# Patient Record
Sex: FEMALE | Race: WHITE | NOT HISPANIC OR LATINO | URBAN - NONMETROPOLITAN AREA
[De-identification: names, ages, dates, MRNs, and addresses within clinical notes are randomized per-mention and may not be internally consistent; named-entity substitution may affect disease eponyms.]

---

## 2020-09-01 ENCOUNTER — HOSPITAL ENCOUNTER (OUTPATIENT)
Facility: HOSPITAL | Age: 60
Discharge: NURSING, REHAB OR LONG-TERM CARE | End: 2020-09-22
Attending: INTERNAL MEDICINE | Admitting: INTERNAL MEDICINE

## 2020-09-01 ENCOUNTER — APPOINTMENT (OUTPATIENT)
Dept: GENERAL RADIOLOGY | Facility: HOSPITAL | Age: 60
End: 2020-09-01

## 2020-09-01 DIAGNOSIS — R13.10 DYSPHAGIA, UNSPECIFIED TYPE: Primary | ICD-10-CM

## 2020-09-01 LAB
ALBUMIN SERPL-MCNC: 3.5 G/DL (ref 3.5–5.2)
ALBUMIN/GLOB SERPL: 1.3 G/DL
ALP SERPL-CCNC: 79 U/L (ref 39–117)
ALT SERPL W P-5'-P-CCNC: 97 U/L (ref 1–33)
ANION GAP SERPL CALCULATED.3IONS-SCNC: 8 MMOL/L (ref 5–15)
AST SERPL-CCNC: 80 U/L (ref 1–32)
BILIRUB SERPL-MCNC: 1.3 MG/DL (ref 0–1.2)
BUN SERPL-MCNC: 33 MG/DL (ref 6–20)
BUN/CREAT SERPL: 132 (ref 7–25)
CALCIUM SPEC-SCNC: 7.8 MG/DL (ref 8.6–10.5)
CHLORIDE SERPL-SCNC: 110 MMOL/L (ref 98–107)
CHOLEST SERPL-MCNC: 251 MG/DL (ref 0–200)
CO2 SERPL-SCNC: 30 MMOL/L (ref 22–29)
CREAT SERPL-MCNC: 0.25 MG/DL (ref 0.57–1)
CRP SERPL-MCNC: 1.53 MG/DL (ref 0–0.5)
GFR SERPL CREATININE-BSD FRML MDRD: >150 ML/MIN/1.73
GLOBULIN UR ELPH-MCNC: 2.7 GM/DL
GLUCOSE BLDC GLUCOMTR-MCNC: 193 MG/DL (ref 70–130)
GLUCOSE BLDC GLUCOMTR-MCNC: 241 MG/DL (ref 70–130)
GLUCOSE SERPL-MCNC: 228 MG/DL (ref 65–99)
MAGNESIUM SERPL-MCNC: 2.8 MG/DL (ref 1.6–2.6)
PHOSPHATE SERPL-MCNC: 2.7 MG/DL (ref 2.5–4.5)
POTASSIUM SERPL-SCNC: 4.4 MMOL/L (ref 3.5–5.2)
PROT SERPL-MCNC: 6.2 G/DL (ref 6–8.5)
SODIUM SERPL-SCNC: 148 MMOL/L (ref 136–145)
TRIGL SERPL-MCNC: 186 MG/DL (ref 0–150)

## 2020-09-01 PROCEDURE — 25010000002 HEPARIN (PORCINE) PER 1000 UNITS: Performed by: INTERNAL MEDICINE

## 2020-09-01 PROCEDURE — 86140 C-REACTIVE PROTEIN: CPT | Performed by: INTERNAL MEDICINE

## 2020-09-01 PROCEDURE — 83735 ASSAY OF MAGNESIUM: CPT | Performed by: INTERNAL MEDICINE

## 2020-09-01 PROCEDURE — 25010000002 LORAZEPAM PER 2 MG

## 2020-09-01 PROCEDURE — 82465 ASSAY BLD/SERUM CHOLESTEROL: CPT | Performed by: INTERNAL MEDICINE

## 2020-09-01 PROCEDURE — 84100 ASSAY OF PHOSPHORUS: CPT | Performed by: INTERNAL MEDICINE

## 2020-09-01 PROCEDURE — 71045 X-RAY EXAM CHEST 1 VIEW: CPT

## 2020-09-01 PROCEDURE — 63710000001 INSULIN LISPRO (HUMAN) PER 5 UNITS: Performed by: INTERNAL MEDICINE

## 2020-09-01 PROCEDURE — 82962 GLUCOSE BLOOD TEST: CPT

## 2020-09-01 PROCEDURE — 25010000002 METHYLPREDNISOLONE PER 125 MG: Performed by: INTERNAL MEDICINE

## 2020-09-01 PROCEDURE — 25010000002 LORAZEPAM PER 2 MG: Performed by: INTERNAL MEDICINE

## 2020-09-01 PROCEDURE — 80053 COMPREHEN METABOLIC PANEL: CPT | Performed by: INTERNAL MEDICINE

## 2020-09-01 PROCEDURE — 84478 ASSAY OF TRIGLYCERIDES: CPT | Performed by: INTERNAL MEDICINE

## 2020-09-01 RX ORDER — ACETAMINOPHEN 325 MG/1
650 TABLET ORAL EVERY 6 HOURS PRN
Status: DISCONTINUED | OUTPATIENT
Start: 2020-09-01 | End: 2020-09-22 | Stop reason: HOSPADM

## 2020-09-01 RX ORDER — LORAZEPAM 2 MG/ML
1 INJECTION INTRAMUSCULAR EVERY 4 HOURS PRN
Status: DISCONTINUED | OUTPATIENT
Start: 2020-09-01 | End: 2020-09-08

## 2020-09-01 RX ORDER — SODIUM CHLORIDE 0.9 % (FLUSH) 0.9 %
10 SYRINGE (ML) INJECTION EVERY 12 HOURS SCHEDULED
Status: DISCONTINUED | OUTPATIENT
Start: 2020-09-01 | End: 2020-09-17 | Stop reason: ALTCHOICE

## 2020-09-01 RX ORDER — DEXTROSE MONOHYDRATE 25 G/50ML
25 INJECTION, SOLUTION INTRAVENOUS
Status: DISCONTINUED | OUTPATIENT
Start: 2020-09-01 | End: 2020-09-22 | Stop reason: HOSPADM

## 2020-09-01 RX ORDER — NICOTINE POLACRILEX 4 MG
15 LOZENGE BUCCAL
Status: DISCONTINUED | OUTPATIENT
Start: 2020-09-01 | End: 2020-09-22 | Stop reason: HOSPADM

## 2020-09-01 RX ORDER — SODIUM CHLORIDE 0.9 % (FLUSH) 0.9 %
10 SYRINGE (ML) INJECTION AS NEEDED
Status: DISCONTINUED | OUTPATIENT
Start: 2020-09-01 | End: 2020-09-17 | Stop reason: ALTCHOICE

## 2020-09-01 RX ORDER — IPRATROPIUM BROMIDE AND ALBUTEROL SULFATE 2.5; .5 MG/3ML; MG/3ML
3 SOLUTION RESPIRATORY (INHALATION)
Status: DISCONTINUED | OUTPATIENT
Start: 2020-09-01 | End: 2020-09-18

## 2020-09-01 RX ORDER — LABETALOL HYDROCHLORIDE 5 MG/ML
20 INJECTION, SOLUTION INTRAVENOUS EVERY 4 HOURS PRN
Status: DISCONTINUED | OUTPATIENT
Start: 2020-09-01 | End: 2020-09-22 | Stop reason: HOSPADM

## 2020-09-01 RX ORDER — HYDRALAZINE HYDROCHLORIDE 20 MG/ML
10 INJECTION INTRAMUSCULAR; INTRAVENOUS EVERY 4 HOURS PRN
Status: DISCONTINUED | OUTPATIENT
Start: 2020-09-01 | End: 2020-09-02

## 2020-09-01 RX ORDER — ONDANSETRON 2 MG/ML
4 INJECTION INTRAMUSCULAR; INTRAVENOUS EVERY 6 HOURS PRN
Status: DISCONTINUED | OUTPATIENT
Start: 2020-09-01 | End: 2020-09-22 | Stop reason: HOSPADM

## 2020-09-01 RX ORDER — LORAZEPAM 2 MG/ML
INJECTION INTRAMUSCULAR
Status: ACTIVE
Start: 2020-09-01 | End: 2020-09-02

## 2020-09-01 RX ORDER — ACETAMINOPHEN 650 MG/1
650 SUPPOSITORY RECTAL EVERY 4 HOURS PRN
Status: DISCONTINUED | OUTPATIENT
Start: 2020-09-01 | End: 2020-09-22 | Stop reason: HOSPADM

## 2020-09-01 RX ORDER — SACCHAROMYCES BOULARDII 250 MG
250 CAPSULE ORAL 2 TIMES DAILY
Status: DISCONTINUED | OUTPATIENT
Start: 2020-09-01 | End: 2020-09-22 | Stop reason: HOSPADM

## 2020-09-01 RX ORDER — PANTOPRAZOLE SODIUM 40 MG/10ML
40 INJECTION, POWDER, LYOPHILIZED, FOR SOLUTION INTRAVENOUS DAILY
Status: DISCONTINUED | OUTPATIENT
Start: 2020-09-02 | End: 2020-09-08

## 2020-09-01 RX ORDER — SODIUM CHLORIDE 0.9 % (FLUSH) 0.9 %
20 SYRINGE (ML) INJECTION AS NEEDED
Status: DISCONTINUED | OUTPATIENT
Start: 2020-09-01 | End: 2020-09-17 | Stop reason: ALTCHOICE

## 2020-09-01 RX ORDER — METHYLPREDNISOLONE SODIUM SUCCINATE 125 MG/2ML
80 INJECTION, POWDER, LYOPHILIZED, FOR SOLUTION INTRAMUSCULAR; INTRAVENOUS EVERY 8 HOURS SCHEDULED
Status: DISCONTINUED | OUTPATIENT
Start: 2020-09-01 | End: 2020-09-08

## 2020-09-01 RX ORDER — HEPARIN SODIUM 5000 [USP'U]/ML
5000 INJECTION, SOLUTION INTRAVENOUS; SUBCUTANEOUS EVERY 8 HOURS SCHEDULED
Status: DISCONTINUED | OUTPATIENT
Start: 2020-09-01 | End: 2020-09-22 | Stop reason: HOSPADM

## 2020-09-01 NOTE — PROGRESS NOTES
"LTACH Fall Assessment Note    Makenzie Norman is a 59 y.o.female  [Ht: 157.5 cm (62\"); Wt: 98.9 kg (218 lb)] admitted to [facility] on 9/1/2020  2:09 PM.    Current medications associated with an increased risk for fall include:  •  hydrALAZINE (APRESOLINE) injection 10 mg, 10 mg, Intravenous, Q4H PRN  •  insulin lispro (humaLOG) injection 2-7 Units, 2-7 Units, Subcutaneous, Q6H  •  labetalol (NORMODYNE,TRANDATE) injection 20 mg, 20 mg, Intravenous, Q4H PRN  •  LORazepam (ATIVAN) injection 1 mg, 1 mg, Intravenous, Q4H PRN  •  methylPREDNISolone sodium succinate (SOLU-Medrol) injection 80 mg, 80 mg, Intravenous, Q8H  •  ondansetron (ZOFRAN) injection 4 mg, 4 mg, Intravenous, Q6H PRN    Woody Blevins, PharmMELCHOR  09/01/20 16:21           "

## 2020-09-01 NOTE — PROGRESS NOTES
"Adult Nutrition  Assessment/PES    Patient Name:  Makenzie Norman  YOB: 1960  MRN: [id number]  Admit Date:  9/1/2020    Assessment Date:  9/1/2020    Comments:  New admit to ltach. She is on bipap/venti mask. No EN route. Pt admitted on TPN. Ordered D15% AA5% with lipids daily. Will monitor pertinent labs and adjust TPN as needed.     Reason for Assessment     Row Name 09/01/20 1517          Reason for Assessment    Reason For Assessment  physician consult;TF/PN     Diagnosis  neurologic conditions;pulmonary disease;diabetes diagnosis/complications;cardiac disease         Nutrition/Diet History     Row Name 09/01/20 1525          Nutrition/Diet History    Typical Food/Fluid Intake  New admit to ltach. Pt is on a venti mask/bipap. She has a central line for TPN with TPN from previous facility hanging. Pt has not been able to pass a swallow study and her brother is not wanting her to have EN.      Factors Affecting Nutritional Intake  difficulty/impaired swallowing         Anthropometrics     Row Name 09/01/20 1527 09/01/20 1300       Anthropometrics    Height  --  157.5 cm (62\")    Weight  --  98.9 kg (218 lb)       Ideal Body Weight (IBW)    Ideal Body Weight (IBW) (kg)  --  50.43    % Ideal Body Weight  --  196.07       Body Mass Index (BMI)    BMI (kg/m2)  --  39.96    BMI Assessment  BMI 35-39.9: obesity grade II  --        Labs/Tests/Procedures/Meds     Row Name 09/01/20 1528          Labs/Procedures/Meds    Lab Results Reviewed  reviewed, pertinent     Lab Results Comments  glucose; Na; alt; Ast; albumin        Medications    Pertinent Medications Reviewed  reviewed, pertinent     Pertinent Medications Comments  heparin; insulin; steroid; protonix; florastor         Physical Findings     Row Name 09/01/20 1528          Physical Findings    Overall Physical Appearance  obese;on oxygen therapy         Estimated/Assessed Needs     Row Name 09/01/20 1528 09/01/20 1300       Calculation Measurements    " "Weight Used For Calculations  62.1 kg (137 lb) adj bw  --    Height  --  157.5 cm (62\")       Estimated/Assessed Needs    Additional Documentation  Calorie Requirements (Group);Fluid Requirements (Group);[place]-St. Jeor Equation (Group);Protein Requirements (Group)  --       Calorie Requirements    Estimated Calorie Need Method  [place]-St Jeor  --       [place]-St. Jeor Equation    RMR ([place]-St. Jeor Equation)  1159.68  --    [place]-St. Jeor Activity Factors  1.2  --    Activity Factors ([place]-St. Jeor)  1391.616  --       Protein Requirements    Weight Used For Protein Calculations  49.9 kg (110 lb)  --    Est Protein Requirement Amount (gms/kg)  1.2 gm protein  --    Estimated Protein Requirements (gms/day)  59.88  --       Fluid Requirements    Estimated Fluid Requirements (mL/day)  1392  --    Estimated Fluid Requirement Method  RDA Method  --    RDA Method (mL)  1392  --        Nutrition Prescription Ordered     Row Name 09/01/20 1542          Nutrition Prescription PO    Current PO Diet  NPO        Nutrition Prescription PN    PN Route  Central     PN Goal Rate (mL/hr)  50 mL/hr     PN Goal Volume (mL)  1200 mL     Dextrose Concentration (%)  15 %     Dextrose (Kcal)  612     Amino Acid Concentration (%)  5 %     Amino Acid (gm)  60     Lipid Concentration (%)  20%     Lipid Volume (mL)  250 mL     Lipid Frequency  Daily         Evaluation of Received Nutrient/Fluid Intake     Row Name 09/01/20 1528          Nutrient/Fluid Evaluation    Additional Documentation  Fluid Intake Evaluation (Group) no fluid intake documented at this time               Problem/Interventions:  Problem 1     Row Name 09/01/20 1543          Nutrition Diagnoses Problem 1    Problem 1  Needs Alternate Route     Etiology (related to)  Medical Diagnosis     Neurological  Other (comment);TBI;CVA h/o TBI; CVA     Pulmonary/Critical Care  Acute respiratory failure;Hypercapnea;Hypoxemia;Pneumonia     Signs/Symptoms (evidenced by)  No " EN Route Available;Other (comment) central line for TPN/lipids               Intervention Goal     Row Name 09/01/20 1547          Intervention Goal    General  Nutrition support treatment;Reduce/improve symptoms;Meet nutritional needs for age/condition;Disease management/therapy;Improved nutrition related lab(s)     TF/PN  Inititiate TF/PN     Weight  No significant weight loss         Nutrition Intervention     Row Name 09/01/20 1547          Nutrition Intervention    RD/Tech Action  Follow Tx progress;Care plan reviewd;Recommend/ordered     Recommended/Ordered  PN         Nutrition Prescription     Row Name 09/01/20 1547          Nutrition Prescription PN    Parenteral Prescription  Parenteral to supply;PN begin/change     PN Route  Central     Dextrose Concentration (%)  15 %     Dextrose (Kcal)  612     Amino Acid Concentration (%)  5.0%     Amino Acid (gm)  60     PN Goal Rate (mL/hr)  50 mL/hr     PN Starting Rate (mL/hr)  50 mL/hr     PN Goal Volume (mL)  1200 mL     PN Starting Volume (mL)  1200 mL     Lipid Concentration (%)  20%     Lipid Volume (mL)  250 mL     Lipid Frequency  Daily     New PN Prescription Ordered?  Yes         Education/Evaluation     Row Name 09/01/20 1548          Education    Education  No discharge needs identified at this time        Monitor/Evaluation    Monitor  Per protocol           Electronically signed by:  Chitra Davis  09/01/20 15:48

## 2020-09-02 LAB
ALBUMIN SERPL-MCNC: 3.3 G/DL (ref 3.5–5.2)
ALBUMIN/GLOB SERPL: 1.4 G/DL
ALP SERPL-CCNC: 78 U/L (ref 39–117)
ALT SERPL W P-5'-P-CCNC: 103 U/L (ref 1–33)
ANION GAP SERPL CALCULATED.3IONS-SCNC: 12 MMOL/L (ref 5–15)
AST SERPL-CCNC: 47 U/L (ref 1–32)
BASOPHILS # BLD AUTO: 0.08 10*3/MM3 (ref 0–0.2)
BASOPHILS NFR BLD AUTO: 0.5 % (ref 0–1.5)
BILIRUB SERPL-MCNC: 1.1 MG/DL (ref 0–1.2)
BUN SERPL-MCNC: 33 MG/DL (ref 6–20)
BUN/CREAT SERPL: 137.5 (ref 7–25)
CALCIUM SPEC-SCNC: 8.7 MG/DL (ref 8.6–10.5)
CHLORIDE SERPL-SCNC: 108 MMOL/L (ref 98–107)
CO2 SERPL-SCNC: 27 MMOL/L (ref 22–29)
CREAT SERPL-MCNC: 0.24 MG/DL (ref 0.57–1)
DEPRECATED RDW RBC AUTO: 45.5 FL (ref 37–54)
EOSINOPHIL # BLD AUTO: 0 10*3/MM3 (ref 0–0.4)
EOSINOPHIL NFR BLD AUTO: 0 % (ref 0.3–6.2)
ERYTHROCYTE [DISTWIDTH] IN BLOOD BY AUTOMATED COUNT: 13.6 % (ref 12.3–15.4)
GFR SERPL CREATININE-BSD FRML MDRD: >150 ML/MIN/1.73
GLOBULIN UR ELPH-MCNC: 2.4 GM/DL
GLUCOSE BLDC GLUCOMTR-MCNC: 215 MG/DL (ref 70–130)
GLUCOSE BLDC GLUCOMTR-MCNC: 228 MG/DL (ref 70–130)
GLUCOSE BLDC GLUCOMTR-MCNC: 242 MG/DL (ref 70–130)
GLUCOSE BLDC GLUCOMTR-MCNC: 251 MG/DL (ref 70–130)
GLUCOSE SERPL-MCNC: 278 MG/DL (ref 65–99)
HCT VFR BLD AUTO: 39.2 % (ref 34–46.6)
HGB BLD-MCNC: 12.4 G/DL (ref 12–15.9)
IMM GRANULOCYTES # BLD AUTO: 0.79 10*3/MM3 (ref 0–0.05)
IMM GRANULOCYTES NFR BLD AUTO: 5 % (ref 0–0.5)
LYMPHOCYTES # BLD AUTO: 0.74 10*3/MM3 (ref 0.7–3.1)
LYMPHOCYTES NFR BLD AUTO: 4.7 % (ref 19.6–45.3)
MAGNESIUM SERPL-MCNC: 2.6 MG/DL (ref 1.6–2.6)
MCH RBC QN AUTO: 28.8 PG (ref 26.6–33)
MCHC RBC AUTO-ENTMCNC: 31.6 G/DL (ref 31.5–35.7)
MCV RBC AUTO: 91.2 FL (ref 79–97)
MONOCYTES # BLD AUTO: 0.74 10*3/MM3 (ref 0.1–0.9)
MONOCYTES NFR BLD AUTO: 4.7 % (ref 5–12)
NEUTROPHILS NFR BLD AUTO: 13.52 10*3/MM3 (ref 1.7–7)
NEUTROPHILS NFR BLD AUTO: 85.1 % (ref 42.7–76)
NRBC BLD AUTO-RTO: 0.1 /100 WBC (ref 0–0.2)
PHOSPHATE SERPL-MCNC: 2.4 MG/DL (ref 2.5–4.5)
PLATELET # BLD AUTO: 176 10*3/MM3 (ref 140–450)
PMV BLD AUTO: 12 FL (ref 6–12)
POTASSIUM SERPL-SCNC: 4.2 MMOL/L (ref 3.5–5.2)
PREALB SERPL-MCNC: 30.3 MG/DL (ref 20–40)
PROT SERPL-MCNC: 5.7 G/DL (ref 6–8.5)
RBC # BLD AUTO: 4.3 10*6/MM3 (ref 3.77–5.28)
SODIUM SERPL-SCNC: 147 MMOL/L (ref 136–145)
WBC # BLD AUTO: 15.87 10*3/MM3 (ref 3.4–10.8)

## 2020-09-02 PROCEDURE — 83735 ASSAY OF MAGNESIUM: CPT | Performed by: INTERNAL MEDICINE

## 2020-09-02 PROCEDURE — 25010000002 HEPARIN (PORCINE) PER 1000 UNITS: Performed by: INTERNAL MEDICINE

## 2020-09-02 PROCEDURE — 25010000002 HYDRALAZINE PER 20 MG: Performed by: INTERNAL MEDICINE

## 2020-09-02 PROCEDURE — 25010000002 HYDRALAZINE PER 20 MG: Performed by: NURSE PRACTITIONER

## 2020-09-02 PROCEDURE — 25010000002 METHYLPREDNISOLONE PER 125 MG: Performed by: INTERNAL MEDICINE

## 2020-09-02 PROCEDURE — 84134 ASSAY OF PREALBUMIN: CPT | Performed by: INTERNAL MEDICINE

## 2020-09-02 PROCEDURE — 84100 ASSAY OF PHOSPHORUS: CPT | Performed by: INTERNAL MEDICINE

## 2020-09-02 PROCEDURE — 25010000002 LORAZEPAM PER 2 MG: Performed by: INTERNAL MEDICINE

## 2020-09-02 PROCEDURE — 85025 COMPLETE CBC W/AUTO DIFF WBC: CPT | Performed by: INTERNAL MEDICINE

## 2020-09-02 PROCEDURE — 80053 COMPREHEN METABOLIC PANEL: CPT | Performed by: INTERNAL MEDICINE

## 2020-09-02 PROCEDURE — 82962 GLUCOSE BLOOD TEST: CPT

## 2020-09-02 PROCEDURE — 63710000001 INSULIN LISPRO (HUMAN) PER 5 UNITS: Performed by: NURSE PRACTITIONER

## 2020-09-02 PROCEDURE — 63710000001 INSULIN LISPRO (HUMAN) PER 5 UNITS: Performed by: INTERNAL MEDICINE

## 2020-09-02 RX ORDER — HYDRALAZINE HYDROCHLORIDE 20 MG/ML
20 INJECTION INTRAMUSCULAR; INTRAVENOUS EVERY 8 HOURS SCHEDULED
Status: DISCONTINUED | OUTPATIENT
Start: 2020-09-02 | End: 2020-09-08 | Stop reason: ALTCHOICE

## 2020-09-02 NOTE — H&P
Elmer Payne M.D.  BEAU Corona          Internal Medicine History and Physical      Name: Makenzie Norman  MRN: [id number]     Acct: [id number]  Room: [facility]/    Admit Date: 9/1/2020  PCP: Provider, No Known    Chief Complaint:     Need for oxygen weaning and nutrition support    History Obtained From:     chart review and unobtainable from patient due to mental status    History of Present Illness:      Makenzie Norman is a  59 y.o.  female who presents with need for continued oxygen weaning and nutrition support following recent acute care stay. The patient had been in her usual state of health at the SNF where she is a long term resident following brain injury when she suffered an aspiration event with loss of consciousness and respiratory arrest. Despite her DNR status, resuscitation efforts were initiated and ROSC achieved after approximately 15 minutes. She transferred to an outlying facility for evaluation and treatment. She was severely acidotic with hypercarbia (pC02 97) and was placed on bipap. She was treated with broad spectrum IV antibiotics. She had some improvement in her neurologic status and CT head negative. TPN was initiated for nutrition support as she has not been felt safe for po intake to this point. She has alternated between NRB and bipap for oxygen support. She transferred to our facility for continued oxygen weaning and nutrition support prior to her planned return to SNF upon discharge. She is tolerated bipap this morning. Eyes open spontaneously and she is moaning, but has offered no comprehensible speech and is not following commands. Due to elevated blood pressures, she has required prn beta blockers x 3 overnight. She is lightly sedated with precedex at this time.     Past Medical History:     Past Medical History:   Diagnosis Date   • Anxiety    • Asthma    • Brain injury (CMS/HCC)    • Bronchitis    • Dysphagia    • GERD (gastroesophageal reflux  "disease)    • Hypertension    • Obesity    • Pseudobulbar affect    • Rheumatoid arthritis (CMS/HCC)     CVA    Past Surgical History:     Past Surgical History:   Procedure Laterality Date   • PEG TUBE INSERTION     • TOE SURGERY     • TUBAL ABDOMINAL LIGATION          Medications Prior to Admission:       Prior to Admission medications    Not on File        Allergies:       Valium [diazepam]; Xanax [alprazolam]; and Morphine    Social History:     Tobacco:    has no tobacco history on file.  Alcohol:      has no alcohol history on file.  Drug Use:  has no drug history on file.    Family History:     No family history on file.    Review of Systems:     Review of Systems   Unable to perform ROS: mental status change       Code Status:    There are no questions and answers to display.       Physical Exam:     Vitals:  Ht 157.5 cm (62\")   Wt 98.9 kg (218 lb)   BMI 39.87 kg/m²   T 98.7 P 69 R 13 /92 Sp02 99% (bipap)  Physical Exam   Constitutional: She appears well-developed and well-nourished.   HENT:   Head: Normocephalic and atraumatic.   Nose: Nose normal.   Mouth/Throat: Oropharynx is clear and moist.   Eyes: Pupils are equal, round, and reactive to light. Conjunctivae and EOM are normal.   Neck: Normal range of motion. Neck supple.   Cardiovascular: Normal rate, regular rhythm, normal heart sounds and intact distal pulses.   Pulmonary/Chest: Effort normal and breath sounds normal.   Abdominal: Soft. Bowel sounds are normal.   obese   Musculoskeletal:   Generalized weakness  Left hemiplegia   Neurological: She is alert.   Eyes open spontaneously  No comprehensible speech  Does not follow commands   Skin: Skin is warm and dry.   Psychiatric: She has a normal mood and affect. Her behavior is normal.   Nursing note and vitals reviewed.            Data:     Lab Results (last 7 days)     Procedure Component Value Units Date/Time    Comprehensive Metabolic Panel [[id number]]  (Abnormal) Collected:  09/02/20 0451 "    Specimen:  Blood Updated:  09/02/20 0616     Glucose 278 mg/dL      BUN 33 mg/dL      Creatinine 0.24 mg/dL      Sodium 147 mmol/L      Potassium 4.2 mmol/L      Comment: Slight hemolysis detected by analyzer. Results may be affected.        Chloride 108 mmol/L      CO2 27.0 mmol/L      Calcium 8.7 mg/dL      Total Protein 5.7 g/dL      Albumin 3.30 g/dL      ALT (SGPT) 103 U/L      AST (SGOT) 47 U/L      Comment: Slight hemolysis detected by analyzer. Results may be affected.        Alkaline Phosphatase 78 U/L      Total Bilirubin 1.1 mg/dL      eGFR Non African Amer >150 mL/min/1.73      Globulin 2.4 gm/dL      A/G Ratio 1.4 g/dL      BUN/Creatinine Ratio 137.5     Anion Gap 12.0 mmol/L     Narrative:       GFR Normal >60  Chronic Kidney Disease <60  Kidney Failure <15      Magnesium [[id number]]  (Normal) Collected:  09/02/20 0451    Specimen:  Blood Updated:  09/02/20 0616     Magnesium 2.6 mg/dL     Phosphorus [[id number]]  (Abnormal) Collected:  09/02/20 0451    Specimen:  Blood Updated:  09/02/20 0616     Phosphorus 2.4 mg/dL     POC Glucose Once [[id number]]  (Abnormal) Collected:  09/02/20 0551    Specimen:  Blood Updated:  09/02/20 0608     Glucose 242 mg/dL      Comment: : CPARROTBurak zhu CarlaMeter ID: [id number]       CBC & Differential [[id number]] Collected:  09/02/20 0451    Specimen:  Blood Updated:  09/02/20 0549    Narrative:       The following orders were created for panel order CBC & Differential.  Procedure                               Abnormality         Status                     ---------                               -----------         ------                     CBC Auto Differential[[id number]]        Abnormal            Final result                 Please view results for these tests on the individual orders.    CBC Auto Differential [[id number]]  (Abnormal) Collected:  09/02/20 0451    Specimen:  Blood Updated:  09/02/20 0549     WBC 15.87 10*3/mm3      RBC 4.30 10*6/mm3       Hemoglobin 12.4 g/dL      Hematocrit 39.2 %      MCV 91.2 fL      MCH 28.8 pg      MCHC 31.6 g/dL      RDW 13.6 %      RDW-SD 45.5 fl      MPV 12.0 fL      Platelets 176 10*3/mm3      Neutrophil % 85.1 %      Lymphocyte % 4.7 %      Monocyte % 4.7 %      Eosinophil % 0.0 %      Basophil % 0.5 %      Immature Grans % 5.0 %      Neutrophils, Absolute 13.52 10*3/mm3      Lymphocytes, Absolute 0.74 10*3/mm3      Monocytes, Absolute 0.74 10*3/mm3      Eosinophils, Absolute 0.00 10*3/mm3      Basophils, Absolute 0.08 10*3/mm3      Immature Grans, Absolute 0.79 10*3/mm3      nRBC 0.1 /100 WBC     Prealbumin [[id number]] Collected:  09/02/20 0451    Specimen:  Blood Updated:  09/02/20 0540    POC Glucose Once [[id number]]  (Abnormal) Collected:  09/01/20 2300    Specimen:  Blood Updated:  09/01/20 2316     Glucose 241 mg/dL      Comment: : CPARROTBurak zhu CarlaMeter ID: [id number]       Triglycerides [[id number]]  (Abnormal) Collected:  09/01/20 1703    Specimen:  Blood Updated:  09/01/20 1810     Triglycerides 186 mg/dL     Cholesterol, Total [[id number]]  (Abnormal) Collected:  09/01/20 1703    Specimen:  Blood Updated:  09/01/20 1810     Total Cholesterol 251 mg/dL     Narrative:       Cholesterol Reference Ranges    (U.[place] Department fo Health and Human Services ATP III Classifications)    Desirable        <200 mg/dl  Borderline High  200-239 mg/dl  High Risk        >240 mg/dl    C-reactive Protein [[id number]]  (Abnormal) Collected:  09/01/20 1703    Specimen:  Blood Updated:  09/01/20 1809     C-Reactive Protein 1.53 mg/dL     Comprehensive Metabolic Panel [[id number]]  (Abnormal) Collected:  09/01/20 1703    Specimen:  Blood Updated:  09/01/20 1807     Glucose 228 mg/dL      BUN 33 mg/dL      Creatinine 0.25 mg/dL      Sodium 148 mmol/L      Potassium 4.4 mmol/L      Chloride 110 mmol/L      CO2 30.0 mmol/L      Calcium 7.8 mg/dL      Total Protein 6.2 g/dL      Albumin 3.50 g/dL      ALT (SGPT) 97 U/L      AST  (SGOT) 80 U/L      Alkaline Phosphatase 79 U/L      Total Bilirubin 1.3 mg/dL      eGFR Non African Amer >150 mL/min/1.73      Globulin 2.7 gm/dL      A/G Ratio 1.3 g/dL      BUN/Creatinine Ratio 132.0     Anion Gap 8.0 mmol/L     Narrative:       GFR Normal >60  Chronic Kidney Disease <60  Kidney Failure <15      Phosphorus [[id number]]  (Normal) Collected:  09/01/20 1703    Specimen:  Blood Updated:  09/01/20 1804     Phosphorus 2.7 mg/dL     Magnesium [[id number]]  (Abnormal) Collected:  09/01/20 1703    Specimen:  Blood Updated:  09/01/20 1802     Magnesium 2.8 mg/dL     POC Glucose Once [[id number]]  (Abnormal) Collected:  09/01/20 1717    Specimen:  Blood Updated:  09/01/20 1736     Glucose 193 mg/dL      Comment: : TAYLOR Morleymichel) CindyMeter ID: [id number]           Xr Chest 1 View    Result Date: 9/1/2020  Narrative: Frontal upright radiograph of the chest 9/1/2020 3:39 PM CDT  COMPARISON: None  HISTORY: Verify central line.  FINDINGS: Perihilar opacities noted in the right chest. There is opacity in the left lower lobe. There appears to be a moderate-sized hiatal hernia.. Cardiac silhouettes normal. Right PIC catheter is satisfactorily position in the superior vena cava..  The osseous structures and surrounding soft tissues demonstrate no acute abnormality.      Impression: 1. Right PIC catheter satisfactorily positioned.   2. Vague opacity in the right perihilar region and left lower lung. An underlying inflammatory process cannot be excluded.   This report was finalized on 09/01/2020 15:58 by Dr. Gonzalo Urias MD.        Assessment:         * No active hospital problems. *    Past Medical History:   Diagnosis Date   • Anxiety    • Asthma    • Brain injury (CMS/HCC)    • Bronchitis    • Dysphagia    • GERD (gastroesophageal reflux disease)    • Hypertension    • Obesity    • Pseudobulbar affect    • Rheumatoid arthritis (CMS/HCC)        Plan:     1. Acute hypoxic/hypercapneic respiratory  failure  2. DM2 with hyperglycemia  3. Dysphagia  4. HTN  5. Late effects of previous CVA  6. GERD  7. Anxiety  8. RA  9. Morbid obesity  10. S/p aspiration event  11. S/p respiratory arrest  12. Pseudobulbar affect  13. Hypernatremia    Continue current treatment. Monitor counts. Increase activity. Labs in am. Glycemic control. Adjust antihypertensive regimen. Maintain patient safety. Oxygen weaning as able. Continue nutrition support via AMONs. Discuss with pharmacy/dietician about adjusting TPN due to hypernatremia.       Electronically signed by BEAU Sheppard on 9/2/2020 at 10:04     Copy sent to Dr. Salinas, No Known  I have discussed the care of Makenzie Norman, including pertinent history and exam findings, with the nurse practitioner.    I have seen and examined the patient and the key elements of all parts of the encounter have been performed by me.  I agree with the assessment, plan and orders as documented by BEAU Corona, after I modified the exam findings and the plan of treatments and the final version is my approved version of the assessment.        Electronically signed by Gerard Payne MD on 9/2/2020 at 10:47

## 2020-09-03 ENCOUNTER — APPOINTMENT (OUTPATIENT)
Dept: GENERAL RADIOLOGY | Facility: HOSPITAL | Age: 60
End: 2020-09-03

## 2020-09-03 LAB
ALBUMIN SERPL-MCNC: 3.1 G/DL (ref 3.5–5.2)
ALBUMIN/GLOB SERPL: 1.2 G/DL
ALP SERPL-CCNC: 80 U/L (ref 39–117)
ALT SERPL W P-5'-P-CCNC: 133 U/L (ref 1–33)
ANION GAP SERPL CALCULATED.3IONS-SCNC: 9 MMOL/L (ref 5–15)
ANISOCYTOSIS BLD QL: ABNORMAL
AST SERPL-CCNC: 55 U/L (ref 1–32)
BILIRUB SERPL-MCNC: 1 MG/DL (ref 0–1.2)
BUN SERPL-MCNC: 32 MG/DL (ref 6–20)
BUN/CREAT SERPL: 100 (ref 7–25)
CA-I BLD-MCNC: 4.41 MG/DL (ref 4.6–5.4)
CALCIUM SPEC-SCNC: 8.7 MG/DL (ref 8.6–10.5)
CHLORIDE SERPL-SCNC: 109 MMOL/L (ref 98–107)
CO2 SERPL-SCNC: 29 MMOL/L (ref 22–29)
CREAT SERPL-MCNC: 0.32 MG/DL (ref 0.57–1)
DEPRECATED RDW RBC AUTO: 45.3 FL (ref 37–54)
ERYTHROCYTE [DISTWIDTH] IN BLOOD BY AUTOMATED COUNT: 13.9 % (ref 12.3–15.4)
GFR SERPL CREATININE-BSD FRML MDRD: >150 ML/MIN/1.73
GLOBULIN UR ELPH-MCNC: 2.5 GM/DL
GLUCOSE BLDC GLUCOMTR-MCNC: 202 MG/DL (ref 70–130)
GLUCOSE BLDC GLUCOMTR-MCNC: 207 MG/DL (ref 70–130)
GLUCOSE BLDC GLUCOMTR-MCNC: 211 MG/DL (ref 70–130)
GLUCOSE SERPL-MCNC: 251 MG/DL (ref 65–99)
HCT VFR BLD AUTO: 38.6 % (ref 34–46.6)
HGB BLD-MCNC: 12.4 G/DL (ref 12–15.9)
LYMPHOCYTES # BLD MANUAL: 0.36 10*3/MM3 (ref 0.7–3.1)
LYMPHOCYTES NFR BLD MANUAL: 1 % (ref 5–12)
LYMPHOCYTES NFR BLD MANUAL: 2 % (ref 19.6–45.3)
Lab: ABNORMAL
MAGNESIUM SERPL-MCNC: 2.7 MG/DL (ref 1.6–2.6)
MCH RBC QN AUTO: 29 PG (ref 26.6–33)
MCHC RBC AUTO-ENTMCNC: 32.1 G/DL (ref 31.5–35.7)
MCV RBC AUTO: 90.2 FL (ref 79–97)
MONOCYTES # BLD AUTO: 0.18 10*3/MM3 (ref 0.1–0.9)
NEUTROPHILS # BLD AUTO: 17.17 10*3/MM3 (ref 1.7–7)
NEUTROPHILS NFR BLD MANUAL: 90.9 % (ref 42.7–76)
NEUTS BAND NFR BLD MANUAL: 5.1 % (ref 0–5)
NRBC SPEC MANUAL: 1 /100 WBC (ref 0–0.2)
NT-PROBNP SERPL-MCNC: 540.2 PG/ML (ref 0–900)
PHOSPHATE SERPL-MCNC: 3.4 MG/DL (ref 2.5–4.5)
PLAT MORPH BLD: NORMAL
PLATELET # BLD AUTO: 211 10*3/MM3 (ref 140–450)
PMV BLD AUTO: 11.3 FL (ref 6–12)
POTASSIUM SERPL-SCNC: 4.3 MMOL/L (ref 3.5–5.2)
PROT SERPL-MCNC: 5.6 G/DL (ref 6–8.5)
RBC # BLD AUTO: 4.28 10*6/MM3 (ref 3.77–5.28)
SODIUM SERPL-SCNC: 147 MMOL/L (ref 136–145)
VARIANT LYMPHS NFR BLD MANUAL: 1 % (ref 0–5)
WBC # BLD AUTO: 17.89 10*3/MM3 (ref 3.4–10.8)
WBC MORPH BLD: NORMAL

## 2020-09-03 PROCEDURE — 83880 ASSAY OF NATRIURETIC PEPTIDE: CPT | Performed by: INTERNAL MEDICINE

## 2020-09-03 PROCEDURE — 80053 COMPREHEN METABOLIC PANEL: CPT | Performed by: INTERNAL MEDICINE

## 2020-09-03 PROCEDURE — 25010000002 HEPARIN (PORCINE) PER 1000 UNITS: Performed by: INTERNAL MEDICINE

## 2020-09-03 PROCEDURE — 25010000002 LORAZEPAM PER 2 MG: Performed by: INTERNAL MEDICINE

## 2020-09-03 PROCEDURE — 25010000002 HYDRALAZINE PER 20 MG: Performed by: NURSE PRACTITIONER

## 2020-09-03 PROCEDURE — 87147 CULTURE TYPE IMMUNOLOGIC: CPT | Performed by: INTERNAL MEDICINE

## 2020-09-03 PROCEDURE — 84100 ASSAY OF PHOSPHORUS: CPT | Performed by: INTERNAL MEDICINE

## 2020-09-03 PROCEDURE — 97161 PT EVAL LOW COMPLEX 20 MIN: CPT

## 2020-09-03 PROCEDURE — 97166 OT EVAL MOD COMPLEX 45 MIN: CPT | Performed by: OCCUPATIONAL THERAPIST

## 2020-09-03 PROCEDURE — 82962 GLUCOSE BLOOD TEST: CPT

## 2020-09-03 PROCEDURE — 25010000002 METHYLPREDNISOLONE PER 125 MG: Performed by: INTERNAL MEDICINE

## 2020-09-03 PROCEDURE — 63710000001 INSULIN LISPRO (HUMAN) PER 5 UNITS: Performed by: NURSE PRACTITIONER

## 2020-09-03 PROCEDURE — 87102 FUNGUS ISOLATION CULTURE: CPT | Performed by: INTERNAL MEDICINE

## 2020-09-03 PROCEDURE — 83735 ASSAY OF MAGNESIUM: CPT | Performed by: INTERNAL MEDICINE

## 2020-09-03 PROCEDURE — 87070 CULTURE OTHR SPECIMN AEROBIC: CPT | Performed by: INTERNAL MEDICINE

## 2020-09-03 PROCEDURE — 74018 RADEX ABDOMEN 1 VIEW: CPT

## 2020-09-03 PROCEDURE — 85025 COMPLETE CBC W/AUTO DIFF WBC: CPT | Performed by: INTERNAL MEDICINE

## 2020-09-03 PROCEDURE — 82330 ASSAY OF CALCIUM: CPT

## 2020-09-03 PROCEDURE — 85007 BL SMEAR W/DIFF WBC COUNT: CPT | Performed by: INTERNAL MEDICINE

## 2020-09-03 PROCEDURE — 87186 SC STD MICRODIL/AGAR DIL: CPT | Performed by: INTERNAL MEDICINE

## 2020-09-03 PROCEDURE — 87205 SMEAR GRAM STAIN: CPT | Performed by: INTERNAL MEDICINE

## 2020-09-03 NOTE — PROGRESS NOTES
Elmer Payne M.D.  BEAU Corona        Internal Medicine Progress Note    9/3/2020   10:55    Name:  Makenzie Norman  MRN:    [id number]     Acct:     [id number]   Room:  [address] [place] [address] Day: 0     Admit Date: 9/1/2020  2:09 PM  PCP: Provider, No Known    Subjective:     C/C: need for continued oxygen weaning and nutrition support.     Interval History: Status:  stayed the same. Resting in bed. No family at bedside. Tolerating bipap at present time. Responds to painful stimulation. No comprehensible speech. Appears in no acute distress. Dr. Payne spoke with family who reports they do not wish to pursue feeding tube placement and are considering hospice/end of life care.     Review of Systems   Unable to perform ROS: mental status change         Medications:     Allergies:   Allergies   Allergen Reactions   • Valium [Diazepam] Unknown - Low Severity   • Xanax [Alprazolam] Unknown - Low Severity   • Morphine Anxiety       Current Meds:   Current Facility-Administered Medications:   •  acetaminophen (TYLENOL) tablet 650 mg, 650 mg, Oral, Q6H PRN **OR** acetaminophen (TYLENOL) suppository 650 mg, 650 mg, Rectal, Q4H PRN, Gerard Payne MD  •  Adult Standard Central TPN, , Intravenous, Q24H (TPN) **AND** Fat Emulsion Plant Based (INTRALIPID,LIPOSYN) 20 % infusion 50 g, 250 mL, Intravenous, Q24H (TPN), Gerard Payne MD  •  dexmedetomidine HCl (PRECEDEX) 400 mcg in sodium chloride 0.9 % 100 mL (4 mcg/mL) infusion, 0.2-1.4 mcg/kg/hr, Intravenous, Titrated, Gerard Payne MD  •  dextrose (D50W) 25 g/ 50mL Intravenous Solution 25 g, 25 g, Intravenous, Q15 Min PRN, Gerard Payne MD  •  dextrose (GLUTOSE) oral gel 15 g, 15 g, Oral, Q15 Min PRN, Gerard Payne MD  •  glucagon (human recombinant) (GLUCAGEN DIAGNOSTIC) injection 1 mg, 1 mg, Subcutaneous, Q15 Min PRN, Gerard Payne MD  •  heparin (porcine) 5000 UNIT/ML injection 5,000 Units, 5,000 Units,  Subcutaneous, Q8H, Gerard Payne MD  •  hydrALAZINE (APRESOLINE) injection 20 mg, 20 mg, Intravenous, Q8H, Lani Weldon, BEAU  •  insulin lispro (humaLOG) injection 0-24 Units, 0-24 Units, Subcutaneous, Q6H, Lani Weldon, APRN  •  ipratropium-albuterol (DUO-NEB) nebulizer solution 3 mL, 3 mL, Nebulization, Q4H - RT, Gerard Payne MD  •  labetalol (NORMODYNE,TRANDATE) injection 20 mg, 20 mg, Intravenous, Q4H PRN, Gerard Payne MD  •  LORazepam (ATIVAN) injection 1 mg, 1 mg, Intravenous, Q4H PRN, Gerard Payne MD  •  methylPREDNISolone sodium succinate (SOLU-Medrol) injection 80 mg, 80 mg, Intravenous, Q8H, Gerard Payne MD  •  ondansetron (ZOFRAN) injection 4 mg, 4 mg, Intravenous, Q6H PRN, Gerard Payne MD  •  pantoprazole (PROTONIX) injection 40 mg, 40 mg, Intravenous, Daily, Gerard Payne MD  •  saccharomyces boulardii (FLORASTOR) capsule 250 mg, 250 mg, Oral, BID, Gerard Payne MD  •  sodium chloride 0.9 % flush 10 mL, 10 mL, Intravenous, Q12H, Gerard Payne MD  •  sodium chloride 0.9 % flush 10 mL, 10 mL, Intravenous, Q12H, Gerard Payne MD  •  sodium chloride 0.9 % flush 10 mL, 10 mL, Intravenous, Q12H, Gerard Payne MD  •  sodium chloride 0.9 % flush 10 mL, 10 mL, Intravenous, PRN, Gerard Payne MD  •  sodium chloride 0.9 % flush 20 mL, 20 mL, Intravenous, PRN, Gerard Payne MD  •  Laurie's amazing butt cream, , Topical, BID, Disha Longo, APRN  •  Laurie's amazing butt cream, , Topical, PRN, Disha Longo, APRN    Data:     Code Status:    There are no questions and answers to display.       No family history on file.    Social History     Socioeconomic History   • Marital status: Unknown     Spouse name: Not on file   • Number of children: Not on file   • Years of education: Not on file   • Highest education level: Not on file       Vitals:  Ht 157.5 cm  "(62\")   Wt 98.9 kg (218 lb)   BMI 39.87 kg/m²   T 98.9 P 88 R 16 /48 Sp02 93% (bipap)          I/O (24Hr):  No intake or output data in the 24 hours ending 09/03/20 1055    Labs and imaging:      Recent Results (from the past 12 hour(s))   POC Glucose Once    Collection Time: 09/02/20 11:37 PM   Result Value Ref Range    Glucose 228 (H) 70 - 130 mg/dL   Comprehensive Metabolic Panel    Collection Time: 09/03/20  4:22 AM   Result Value Ref Range    Glucose 251 (H) 65 - 99 mg/dL    BUN 32 (H) 6 - 20 mg/dL    Creatinine 0.32 (L) 0.57 - 1.00 mg/dL    Sodium 147 (H) 136 - 145 mmol/L    Potassium 4.3 3.5 - 5.2 mmol/L    Chloride 109 (H) 98 - 107 mmol/L    CO2 29.0 22.0 - 29.0 mmol/L    Calcium 8.7 8.6 - 10.5 mg/dL    Total Protein 5.6 (L) 6.0 - 8.5 g/dL    Albumin 3.10 (L) 3.50 - 5.20 g/dL    ALT (SGPT) 133 (H) 1 - 33 U/L    AST (SGOT) 55 (H) 1 - 32 U/L    Alkaline Phosphatase 80 39 - 117 U/L    Total Bilirubin 1.0 0.0 - 1.2 mg/dL    eGFR Non African Amer >150 >60 mL/min/1.73    Globulin 2.5 gm/dL    A/G Ratio 1.2 g/dL    BUN/Creatinine Ratio 100.0 (H) 7.0 - 25.0    Anion Gap 9.0 5.0 - 15.0 mmol/L   Magnesium    Collection Time: 09/03/20  4:22 AM   Result Value Ref Range    Magnesium 2.7 (H) 1.6 - 2.6 mg/dL   Phosphorus    Collection Time: 09/03/20  4:22 AM   Result Value Ref Range    Phosphorus 3.4 2.5 - 4.5 mg/dL   BNP    Collection Time: 09/03/20  4:22 AM   Result Value Ref Range    proBNP 540.2 0.0 - 900.0 pg/mL   CBC Auto Differential    Collection Time: 09/03/20  4:22 AM   Result Value Ref Range    WBC 17.89 (H) 3.40 - 10.80 10*3/mm3    RBC 4.28 3.77 - 5.28 10*6/mm3    Hemoglobin 12.4 12.0 - 15.9 g/dL    Hematocrit 38.6 34.0 - 46.6 %    MCV 90.2 79.0 - 97.0 fL    MCH 29.0 26.6 - 33.0 pg    MCHC 32.1 31.5 - 35.7 g/dL    RDW 13.9 12.3 - 15.4 %    RDW-SD 45.3 37.0 - 54.0 fl    MPV 11.3 6.0 - 12.0 fL    Platelets 211 140 - 450 10*3/mm3   Manual Differential    Collection Time: 09/03/20  4:22 AM   Result Value " Ref Range    Neutrophil % 90.9 (H) 42.7 - 76.0 %    Lymphocyte % 2.0 (L) 19.6 - 45.3 %    Monocyte % 1.0 (L) 5.0 - 12.0 %    Bands %  5.1 (H) 0.0 - 5.0 %    Atypical Lymphocyte % 1.0 0.0 - 5.0 %    Neutrophils Absolute 17.17 (H) 1.70 - 7.00 10*3/mm3    Lymphocytes Absolute 0.36 (L) 0.70 - 3.10 10*3/mm3    Monocytes Absolute 0.18 0.10 - 0.90 10*3/mm3    nRBC 1.0 (H) 0.0 - 0.2 /100 WBC    Anisocytosis Slight/1+ None Seen    WBC Morphology Normal Normal    Platelet Morphology Normal Normal   Calcium, Ionized    Collection Time: 09/03/20  4:34 AM   Result Value Ref Range    Ionized Calcium 4.41 (L) 4.60 - 5.40 mg/dL    Collected by [id number]    POC Glucose Once    Collection Time: 09/03/20  5:15 AM   Result Value Ref Range    Glucose 202 (H) 70 - 130 mg/dL         Physical Examination:        Physical Exam   Constitutional: She appears well-developed and well-nourished.   HENT:   Head: Normocephalic and atraumatic.   Nose: Nose normal.   Facial edema  MM dry   Eyes: Pupils are equal, round, and reactive to light. Conjunctivae and EOM are normal.   Neck: Normal range of motion. Neck supple.   Cardiovascular: Normal rate, regular rhythm, normal heart sounds and intact distal pulses.   Pulmonary/Chest: Effort normal and breath sounds normal.   Abdominal: Soft. Bowel sounds are normal.   obese   Musculoskeletal:   Generalized weakness  Left hemiplegia   Neurological:   Arouses to painful stimulation  Does not follow commands  No comprehensible speech   Skin: Skin is warm and dry.   Psychiatric: She has a normal mood and affect. Her behavior is normal.   Nursing note and vitals reviewed.        Assessment:            * No active hospital problems. *    Past Medical History:   Diagnosis Date   • Anxiety    • Asthma    • Brain injury (CMS/HCC)    • Bronchitis    • Dysphagia    • GERD (gastroesophageal reflux disease)    • Hypertension    • Obesity    • Pseudobulbar affect    • Rheumatoid arthritis (CMS/HCC)         Plan:         1. Acute hypoxic/hypercapneic respiratory failure  2. DM2 with hyperglycemia  3. Dysphagia  4. HTN  5. Late effects of previous CVA  6. GERD  7. Anxiety  8. RA  9. Morbid obesity  10. S/p aspiration event  11. S/p respiratory arrest  12. Pseudobulbar affect  13. Hypernatremia    Continue current treatment. Monitor counts. Increase activity as tolerated. Continue nutrition support via AMONs. Continue TPN for now - family does not wish to pursue feeding tube placement. Labs Tuesday. Maintain patient safety. Oxygen weaning as tolerated. Wean precedex.       Electronically signed by BEAU Sheppard on 9/3/2020 at 10:55

## 2020-09-04 LAB
GLUCOSE BLDC GLUCOMTR-MCNC: 167 MG/DL (ref 70–130)
GLUCOSE BLDC GLUCOMTR-MCNC: 197 MG/DL (ref 70–130)
GLUCOSE BLDC GLUCOMTR-MCNC: 218 MG/DL (ref 70–130)
GLUCOSE BLDC GLUCOMTR-MCNC: 222 MG/DL (ref 70–130)
MAGNESIUM SERPL-MCNC: 2.6 MG/DL (ref 1.6–2.6)
PHOSPHATE SERPL-MCNC: 3.4 MG/DL (ref 2.5–4.5)

## 2020-09-04 PROCEDURE — 25010000002 METHYLPREDNISOLONE PER 125 MG: Performed by: INTERNAL MEDICINE

## 2020-09-04 PROCEDURE — 25010000002 LORAZEPAM PER 2 MG: Performed by: INTERNAL MEDICINE

## 2020-09-04 PROCEDURE — 82962 GLUCOSE BLOOD TEST: CPT

## 2020-09-04 PROCEDURE — 92610 EVALUATE SWALLOWING FUNCTION: CPT | Performed by: SPEECH-LANGUAGE PATHOLOGIST

## 2020-09-04 PROCEDURE — 63710000001 INSULIN LISPRO (HUMAN) PER 5 UNITS: Performed by: NURSE PRACTITIONER

## 2020-09-04 PROCEDURE — 25010000002 HEPARIN (PORCINE) PER 1000 UNITS: Performed by: INTERNAL MEDICINE

## 2020-09-04 PROCEDURE — 83735 ASSAY OF MAGNESIUM: CPT | Performed by: INTERNAL MEDICINE

## 2020-09-04 PROCEDURE — 97110 THERAPEUTIC EXERCISES: CPT

## 2020-09-04 PROCEDURE — 84100 ASSAY OF PHOSPHORUS: CPT | Performed by: INTERNAL MEDICINE

## 2020-09-04 PROCEDURE — 25010000002 HYDRALAZINE PER 20 MG: Performed by: NURSE PRACTITIONER

## 2020-09-04 RX ORDER — ECHINACEA PURPUREA EXTRACT 125 MG
1 TABLET ORAL AS NEEDED
Status: DISCONTINUED | OUTPATIENT
Start: 2020-09-04 | End: 2020-09-22 | Stop reason: HOSPADM

## 2020-09-04 RX ORDER — CLONAZEPAM 0.5 MG/1
1 TABLET ORAL 2 TIMES DAILY
Status: DISPENSED | OUTPATIENT
Start: 2020-09-04 | End: 2020-09-10

## 2020-09-04 RX ORDER — CARVEDILOL 3.12 MG/1
6.25 TABLET ORAL 2 TIMES DAILY WITH MEALS
Status: DISCONTINUED | OUTPATIENT
Start: 2020-09-04 | End: 2020-09-21

## 2020-09-04 RX ORDER — OXYBUTYNIN CHLORIDE 5 MG/1
5 TABLET ORAL 3 TIMES DAILY
Status: DISCONTINUED | OUTPATIENT
Start: 2020-09-04 | End: 2020-09-21

## 2020-09-04 RX ORDER — RISPERIDONE 0.5 MG/1
0.5 TABLET ORAL NIGHTLY
Status: DISCONTINUED | OUTPATIENT
Start: 2020-09-04 | End: 2020-09-21

## 2020-09-04 NOTE — PROGRESS NOTES
Elmer Payne M.D.  [facility]        Internal Medicine Progress Note    9/4/2020   13:23    Name:  Makenzie Norman  MRN:    [id number]     Acct:     [id number]   Room:  [address] [place] [address] Day: 0     Admit Date: 9/1/2020  2:09 PM  PCP: Provider, No Known    Subjective:     C/C: need for continued oxygen weaning and nutrition support.     Interval History: Status:  stayed the same. Resting in bed. No family at bedside. Tolerating venturi-mask at present time. Responds to painful stimulation. No comprehensible speech. Appears in no acute distress. Sputum culture positive for MRSA.    *Dr. Payne spoke with family who reports they do not wish to pursue feeding tube placement and are considering hospice/end of life care.     Review of Systems   Unable to perform ROS: mental status change         Medications:     Allergies:   Allergies   Allergen Reactions   • Valium [Diazepam] Unknown - Low Severity   • Xanax [Alprazolam] Unknown - Low Severity   • Morphine Anxiety       Current Meds:   Current Facility-Administered Medications:   •  acetaminophen (TYLENOL) tablet 650 mg, 650 mg, Oral, Q6H PRN **OR** acetaminophen (TYLENOL) suppository 650 mg, 650 mg, Rectal, Q4H PRN, Gerard Payne MD  •  Adult Standard Central TPN, , Intravenous, Q24H (TPN) **AND** Fat Emulsion Plant Based (INTRALIPID,LIPOSYN) 20 % infusion 50 g, 250 mL, Intravenous, Q24H (TPN), Gerard Payne MD  •  carvedilol (COREG) tablet 6.25 mg, 6.25 mg, Per G Tube, BID With Meals, Gerard Payne MD  •  clonazePAM (KlonoPIN) tablet 1 mg, 1 mg, Per G Tube, BID, Gerard Payne MD  •  dexmedetomidine HCl (PRECEDEX) 400 mcg in sodium chloride 0.9 % 100 mL (4 mcg/mL) infusion, 0.2-1.4 mcg/kg/hr, Intravenous, Titrated, Gerard Payne MD  •  dextrose (D50W) 25 g/ 50mL Intravenous Solution 25 g, 25 g, Intravenous, Q15 Min PRN, Gerard Payne MD  •  dextrose (GLUTOSE) oral gel 15 g, 15 g, Oral, Q15 Min  PRN, Gerard Payne MD  •  glucagon (human recombinant) (GLUCAGEN DIAGNOSTIC) injection 1 mg, 1 mg, Subcutaneous, Q15 Min PRN, Gerard Payne MD  •  guaiFENesin (ROBITUSSIN) 100 MG/5ML oral solution 200 mg, 200 mg, Per G Tube, Q6H, Gerard Payne MD  •  heparin (porcine) 5000 UNIT/ML injection 5,000 Units, 5,000 Units, Subcutaneous, Q8H, Gerard Payne MD  •  hydrALAZINE (APRESOLINE) injection 20 mg, 20 mg, Intravenous, Q8H, Lani Weldon APRN  •  insulin lispro (humaLOG) injection 0-24 Units, 0-24 Units, Subcutaneous, Q6H, Lani Weldon APRN  •  ipratropium-albuterol (DUO-NEB) nebulizer solution 3 mL, 3 mL, Nebulization, Q4H - RT, Gerard Payne MD  •  labetalol (NORMODYNE,TRANDATE) injection 20 mg, 20 mg, Intravenous, Q4H PRN, Gerard Payne MD  •  LORazepam (ATIVAN) injection 1 mg, 1 mg, Intravenous, Q4H PRN, Gerard Payne MD  •  methylPREDNISolone sodium succinate (SOLU-Medrol) injection 80 mg, 80 mg, Intravenous, Q8H, Gerard Payne MD  •  ondansetron (ZOFRAN) injection 4 mg, 4 mg, Intravenous, Q6H PRN, Gerard Payne MD  •  oxybutynin (DITROPAN) tablet 5 mg, 5 mg, Per G Tube, TID, Gerard Payne MD  •  pantoprazole (PROTONIX) injection 40 mg, 40 mg, Intravenous, Daily, Gerard Payne MD  •  risperiDONE (risperDAL) tablet 0.5 mg, 0.5 mg, Per G Tube, Nightly, Gerard Payne MD  •  saccharomyces boulardii (FLORASTOR) capsule 250 mg, 250 mg, Oral, BID, Gerard Payne MD  •  sodium chloride 0.9 % flush 10 mL, 10 mL, Intravenous, Q12H, Gerard Payne MD  •  sodium chloride 0.9 % flush 10 mL, 10 mL, Intravenous, Q12H, Gerard Payne MD  •  sodium chloride 0.9 % flush 10 mL, 10 mL, Intravenous, Q12H, Gerard Payne MD  •  sodium chloride 0.9 % flush 10 mL, 10 mL, Intravenous, PRN, Gerard Payne MD  •  sodium chloride 0.9 % flush 20 mL, 20 mL, Intravenous, PRN,  "Gerard Payne MD  •  sodium chloride nasal spray 1 spray, 1 spray, Each Nare, PRN, Gerard Payne MD  •  vancomycin 1 g/250 mL 0.9% NS (vial-mate), 1,000 mg, Intravenous, Q12H, Lani Weldon APRN  •  Laurie's amazing butt cream, , Topical, BID, Disha Longo, BEAU  •  Laurie's amazing butt cream, , Topical, PRN, Disha Longo, BEAU    Data:     Code Status:    There are no questions and answers to display.       No family history on file.    Social History     Socioeconomic History   • Marital status: Unknown     Spouse name: Not on file   • Number of children: Not on file   • Years of education: Not on file   • Highest education level: Not on file       Vitals:  Ht 157.5 cm (62\")   Wt 98.9 kg (218 lb)   BMI 39.87 kg/m²   T 97.9 P 85 R 12 /78 Sp02 84% (venturi-mask)          I/O (24Hr):  No intake or output data in the 24 hours ending 09/04/20 1323    Labs and imaging:      Recent Results (from the past 12 hour(s))   Magnesium    Collection Time: 09/04/20  4:19 AM   Result Value Ref Range    Magnesium 2.6 1.6 - 2.6 mg/dL   POC Glucose Once    Collection Time: 09/04/20  5:28 AM   Result Value Ref Range    Glucose 222 (H) 70 - 130 mg/dL   Phosphorus    Collection Time: 09/04/20  6:39 AM   Result Value Ref Range    Phosphorus 3.4 2.5 - 4.5 mg/dL   POC Glucose Once    Collection Time: 09/04/20 11:46 AM   Result Value Ref Range    Glucose 197 (H) 70 - 130 mg/dL         Physical Examination:        Physical Exam   Constitutional: She appears well-developed and well-nourished.   HENT:   Head: Normocephalic and atraumatic.   Nose: Nose normal.   Facial edema  MM dry   Eyes: Pupils are equal, round, and reactive to light. Conjunctivae and EOM are normal.   Neck: Normal range of motion. Neck supple.   Cardiovascular: Normal rate, regular rhythm, normal heart sounds and intact distal pulses.   Pulmonary/Chest: Effort normal and breath sounds normal.   Abdominal: Soft. " Bowel sounds are normal.   obese   Musculoskeletal:   Generalized weakness  Left hemiplegia   Neurological:   Arouses to painful stimulation  Does not follow commands  No comprehensible speech   Skin: Skin is warm and dry.   Psychiatric: She has a normal mood and affect. Her behavior is normal.   Nursing note and vitals reviewed.        Assessment:            * No active hospital problems. *    Past Medical History:   Diagnosis Date   • Anxiety    • Asthma    • Brain injury (CMS/HCC)    • Bronchitis    • Dysphagia    • GERD (gastroesophageal reflux disease)    • Hypertension    • Obesity    • Pseudobulbar affect    • Rheumatoid arthritis (CMS/HCC)         Plan:        1. Acute hypoxic/hypercapneic respiratory failure  2. DM2 with hyperglycemia  3. Dysphagia  4. HTN  5. Late effects of previous CVA  6. GERD  7. Anxiety  8. RA  9. Morbid obesity  10. S/p aspiration event  11. S/p respiratory arrest  12. Pseudobulbar affect  13. Hypernatremia  14. MRSA sputum    Continue current treatment. Monitor counts. Increase activity as tolerated. Continue nutrition support via AMONs. Continue TPN for now - family does not wish to pursue feeding tube placement. Labs Tuesday. Maintain patient safety. Oxygen weaning as tolerated. Wean precedex and dc. Begin vancomycin.       Electronically signed by BEAU Sheppard on 9/4/2020 at 13:23     I have discussed the care of Makenzie Norman, including pertinent history and exam findings, with the nurse practitioner.    I have seen and examined the patient and the key elements of all parts of the encounter have been performed by me.  I agree with the assessment, plan and orders as documented by BEAU Corona, after I modified the exam findings and the plan of treatments and the final version is my approved version of the assessment.        Electronically signed by Gerard Payne MD on 9/4/2020 at 18:40

## 2020-09-04 NOTE — PROGRESS NOTES
"Pharmacy Dosing Service  Pharmacokinetics  Vancomycin Initial Evaluation    Assessment/Action/Plan:  Vancomycin therapy initiated at 1000mg IV Q12h for pneumonia.     [employer] Calculations below:  Regimen: 1000 mg every 12 hours   Start time: 10:45 on 09/04/2020  Exposure target: AUC24 (range)400-600 mg/L.hr  AUC24,ss: 365 mg/L.hr  PAUC*: 40 %  Ctrough,ss: 11 mg/L  Pconc*: 9 %  Tox.: 7 %    Per discussion with charge RN, patient on Vancomycin 1000mg IV Q12h at OSH prior to transfer (trough level from OSH listed in Epic = 15). Will continue 1000mg IV Q12h for now. No levels ordered at this time. Current vancomycin end date: 9-10-20. Pharmacy will monitor renal function and adjust dose accordingly.     Subjective:  Makenzie Norman is a 59 y.o. female initiated on Vancomycin 1000mg IV Q12h with a \"pharmacy to dose\" consult     Objective:  Ht: 157.5 cm (62\"); Wt: 98.9 kg (218 lb)  Estimated Creatinine Clearance: 213.1 mL/min (A) (by C-G formula based on SCr of 0.32 mg/dL (L)).     Lab Results   Component Value Date    CREATININE 0.32 (L) 09/03/2020    CREATININE 0.24 (L) 09/02/2020    CREATININE 0.25 (L) 09/01/2020    CREATININE 0.58 (L) 09/01/2020    CREATININE 0.63 08/31/2020    CREATININE 0.51 (L) 08/30/2020      Lab Results   Component Value Date    WBC 17.89 (H) 09/03/2020    WBC 15.87 (H) 09/02/2020    WBC 15.8 (H) 12/24/2018      Baseline culture results:  Microbiology Results (last 10 days)       Procedure Component Value - Date/Time    Respiratory Culture - Sputum, Oropharynx [[id number]]  (Abnormal) Collected:  09/03/20 0938    Lab Status:  Preliminary result Specimen:  Sputum from Oropharynx Updated:  09/04/20 0800     Respiratory Culture Moderate growth (3+) Staphylococcus aureus, MRSA     Comment:   Methicillin resistant Staphylococcus aureus, Patient may be an isolation risk.         No Normal Respiratory Agustina     Gram Stain Greater than 25 WBCs per low power field      Rare (1+) Epithelial cells per low " power field      Many (4+) Mixed gram positive diana            Dwaine Griffith, PharmD  09/04/20 10:49

## 2020-09-05 LAB
BACTERIA SPEC RESP CULT: ABNORMAL
BACTERIA SPEC RESP CULT: ABNORMAL
GLUCOSE BLDC GLUCOMTR-MCNC: 193 MG/DL (ref 70–130)
GLUCOSE BLDC GLUCOMTR-MCNC: 200 MG/DL (ref 70–130)
GLUCOSE BLDC GLUCOMTR-MCNC: 205 MG/DL (ref 70–130)
GLUCOSE BLDC GLUCOMTR-MCNC: 216 MG/DL (ref 70–130)
GLUCOSE BLDC GLUCOMTR-MCNC: 230 MG/DL (ref 70–130)
GRAM STN SPEC: ABNORMAL
VANCOMYCIN TROUGH SERPL-MCNC: 7.8 MCG/ML (ref 5–20)

## 2020-09-05 PROCEDURE — 25010000002 METHYLPREDNISOLONE PER 125 MG: Performed by: INTERNAL MEDICINE

## 2020-09-05 PROCEDURE — 82962 GLUCOSE BLOOD TEST: CPT

## 2020-09-05 PROCEDURE — 25010000002 HEPARIN (PORCINE) PER 1000 UNITS: Performed by: INTERNAL MEDICINE

## 2020-09-05 PROCEDURE — 25010000002 HYDRALAZINE PER 20 MG: Performed by: NURSE PRACTITIONER

## 2020-09-05 PROCEDURE — 80202 ASSAY OF VANCOMYCIN: CPT | Performed by: INTERNAL MEDICINE

## 2020-09-05 PROCEDURE — 63710000001 INSULIN LISPRO (HUMAN) PER 5 UNITS: Performed by: NURSE PRACTITIONER

## 2020-09-05 NOTE — PROGRESS NOTES
Elmer Payne M.D.  [facility]        Internal Medicine Progress Note    9/5/2020   17:29    Name:  Makenzie Norman  MRN:    [id number]     Acct:     [id number]   Room:  [address] [place] [address] Day: 0     Admit Date: 9/1/2020  2:09 PM  PCP: Provider, No Known    Subjective:     C/C: need for continued oxygen weaning and nutrition support.     Interval History: Status:  stayed the same.  Sleeping in bed, arouses easily.  No family at bedside.  Currently on BiPAP with 35% FiO2.  Non-comprehensive speech.  Afebrile.  Appears to be resting comfortably.  No new concerns identified or voiced by nursing.    Review of Systems   Unable to perform ROS: mental status change         Medications:     Allergies:   Allergies   Allergen Reactions   • Valium [Diazepam] Unknown - Low Severity   • Xanax [Alprazolam] Unknown - Low Severity   • Morphine Anxiety       Current Meds:   Current Facility-Administered Medications:   •  acetaminophen (TYLENOL) tablet 650 mg, 650 mg, Oral, Q6H PRN **OR** acetaminophen (TYLENOL) suppository 650 mg, 650 mg, Rectal, Q4H PRN, Gerard Payne MD  •  Adult Standard Central TPN, , Intravenous, Q24H (TPN) **AND** Fat Emulsion Plant Based (INTRALIPID,LIPOSYN) 20 % infusion 50 g, 250 mL, Intravenous, Q24H (TPN), Gerard Payne MD  •  carvedilol (COREG) tablet 6.25 mg, 6.25 mg, Per G Tube, BID With Meals, Gerard Payne MD  •  clonazePAM (KlonoPIN) tablet 1 mg, 1 mg, Per G Tube, BID, Gerard Payne MD  •  dexmedetomidine HCl (PRECEDEX) 400 mcg in sodium chloride 0.9 % 100 mL (4 mcg/mL) infusion, 0.2-1.4 mcg/kg/hr, Intravenous, Titrated, Gerard Payne MD  •  dextrose (D50W) 25 g/ 50mL Intravenous Solution 25 g, 25 g, Intravenous, Q15 Min PRN, Gerard Payne MD  •  dextrose (GLUTOSE) oral gel 15 g, 15 g, Oral, Q15 Min PRN, Gerard Payne MD  •  glucagon (human recombinant) (GLUCAGEN DIAGNOSTIC) injection 1 mg, 1 mg, Subcutaneous, Q15  Min PRN, Gerard Payne MD  •  guaiFENesin (ROBITUSSIN) 100 MG/5ML oral solution 200 mg, 200 mg, Per G Tube, Q6H, Gerard Payne MD  •  heparin (porcine) 5000 UNIT/ML injection 5,000 Units, 5,000 Units, Subcutaneous, Q8H, Gerard Payne MD  •  hydrALAZINE (APRESOLINE) injection 20 mg, 20 mg, Intravenous, Q8H, Lani Weldon APRN  •  insulin lispro (humaLOG) injection 0-24 Units, 0-24 Units, Subcutaneous, Q6H, Lani Weldon APRN  •  ipratropium-albuterol (DUO-NEB) nebulizer solution 3 mL, 3 mL, Nebulization, Q4H - RT, Gerard Payne MD  •  labetalol (NORMODYNE,TRANDATE) injection 20 mg, 20 mg, Intravenous, Q4H PRN, Gerard Payne MD  •  LORazepam (ATIVAN) injection 1 mg, 1 mg, Intravenous, Q4H PRN, Gerard Payne MD  •  methylPREDNISolone sodium succinate (SOLU-Medrol) injection 80 mg, 80 mg, Intravenous, Q8H, Gerard Payne MD  •  ondansetron (ZOFRAN) injection 4 mg, 4 mg, Intravenous, Q6H PRN, Gerard Payne MD  •  oxybutynin (DITROPAN) tablet 5 mg, 5 mg, Per G Tube, TID, Gerard Payne MD  •  pantoprazole (PROTONIX) injection 40 mg, 40 mg, Intravenous, Daily, Gerard Payne MD  •  risperiDONE (risperDAL) tablet 0.5 mg, 0.5 mg, Per G Tube, Nightly, Gerard Payne MD  •  saccharomyces boulardii (FLORASTOR) capsule 250 mg, 250 mg, Oral, BID, Gerard Payne MD  •  sodium chloride 0.9 % flush 10 mL, 10 mL, Intravenous, Q12H, Gerard Payne MD  •  sodium chloride 0.9 % flush 10 mL, 10 mL, Intravenous, Q12H, Gerard Payne MD  •  sodium chloride 0.9 % flush 10 mL, 10 mL, Intravenous, Q12H, Gerard Payne MD  •  sodium chloride 0.9 % flush 10 mL, 10 mL, Intravenous, PRN, Gerard Payne MD  •  sodium chloride 0.9 % flush 20 mL, 20 mL, Intravenous, PRN, Gerard Payne MD  •  sodium chloride nasal spray 1 spray, 1 spray, Each Nare, ABDIRIZAK, Gerard Payne MD  •  vancomycin  "1 g/250 mL 0.9% NS (vial-mate), 1,000 mg, Intravenous, Q12H, Lani Weldon APRN  •  Laurie's amazing butt cream, , Topical, BID, Disha Longo APRN  •  Laurie's amazing butt cream, , Topical, PRN, Disha Longo APRN    Data:     Code Status:    There are no questions and answers to display.       No family history on file.    Social History     Socioeconomic History   • Marital status: Unknown     Spouse name: Not on file   • Number of children: Not on file   • Years of education: Not on file   • Highest education level: Not on file       Vitals:  Ht 157.5 cm (62.01\")   Wt 98.9 kg (218 lb 0.6 oz)   BMI 39.87 kg/m²   T 97.4 P 78 R 18 /66 Sp02 96% (BiPAP with FiO2 of 35%)          I/O (24Hr):  No intake or output data in the 24 hours ending 09/05/20 1729    Labs and imaging:      Recent Results (from the past 12 hour(s))   POC Glucose Once    Collection Time: 09/05/20 11:26 AM   Result Value Ref Range    Glucose 205 (H) 70 - 130 mg/dL         Physical Examination:        Physical Exam   Constitutional: She appears well-developed and well-nourished.   HENT:   Head: Normocephalic and atraumatic.   Nose: Nose normal.   Facial edema  MM dry   Eyes: Pupils are equal, round, and reactive to light. Conjunctivae and EOM are normal.   Neck: Normal range of motion. Neck supple.   Cardiovascular: Normal rate, regular rhythm, normal heart sounds and intact distal pulses.   Pulmonary/Chest: Effort normal and breath sounds normal.   Abdominal: Soft. Bowel sounds are normal.   obese   Musculoskeletal:   Generalized weakness  Left hemiplegia   Neurological:   Open eyes to verbal command  No comprehensible speech   Skin: Skin is warm and dry.   Nursing note and vitals reviewed.        Assessment:            * No active hospital problems. *    Past Medical History:   Diagnosis Date   • Anxiety    • Asthma    • Brain injury (CMS/HCC)    • Bronchitis    • Dysphagia    • GERD (gastroesophageal " reflux disease)    • Hypertension    • Obesity    • Pseudobulbar affect    • Rheumatoid arthritis (CMS/HCC)         Plan:        1. Acute hypoxic/hypercapneic respiratory failure  2. DM2 with hyperglycemia  3. Dysphagia  4. HTN  5. Late effects of previous CVA  6. GERD  7. Anxiety  8. RA  9. Morbid obesity  10. S/p aspiration event  11. S/p respiratory arrest  12. Pseudobulbar affect  13. Hypernatremia  14. MRSA sputum    Continue current treatment. Monitor counts. Increase activity as tolerated. Continue nutrition support via AMONs. Continue TPN for now - family does not wish to pursue feeding tube placement. Labs Tuesday. Maintain patient safety. Oxygen weaning as tolerated. Wean precedex and dc.  Receiving vancomycin for MRSA in the sputum.      Electronically signed by BEAU Stone on 9/5/2020 at 17:29     I have discussed the care of Makenzie Norman, including pertinent history and exam findings, with the nurse practitioner.    I have seen and examined the patient and the key elements of all parts of the encounter have been performed by me.  I agree with the assessment, plan and orders as documented by BEAU Diaz, after I modified the exam findings and the plan of treatments and the final version is my approved version of the assessment.        Electronically signed by Gerard Payne MD on 9/5/2020 at 19:26

## 2020-09-05 NOTE — PROGRESS NOTES
"Pharmacy Dosing Service  Pharmacokinetics  Vancomycin Follow-up Evaluation    Assessment/Action/Plan:  Vancomycin initiated yesterday for pneumonia.     InsightRX Calculation for current regimen:  Regimen: 1000 mg every 12 hours   Exposure target: AUC24 (range)400-600 mg/L.hr  AUC24,ss: 368 mg/L.hr  PAUC*: 41 %  Ctrough,ss: 11.1 mg/L  Pconc*: 10 %  Tox.: 7 %    Ordered a Vancomycin trough prior to tonight's (4th) dose. Pharmacy will continue to monitor renal function and adjust dose accordingly.     Subjective:  Makenzie Norman is a 59 y.o. female currently on Vancomycin 1000 mg IV every 12 hours for the treatment of pneumonia, day 2 of therapy (Current vancomycin end date: 9-11-20).    Objective:  Ht: 157.5 cm (62.01\"); Wt: 98.9 kg (218 lb 0.6 oz)  Estimated Creatinine Clearance: 213.1 mL/min (A) (by C-G formula based on SCr of 0.32 mg/dL (L)).     Lab Results   Component Value Date    CREATININE 0.32 (L) 09/03/2020    CREATININE 0.24 (L) 09/02/2020    CREATININE 0.25 (L) 09/01/2020    CREATININE 0.58 (L) 09/01/2020    CREATININE 0.63 08/31/2020    CREATININE 0.51 (L) 08/30/2020      Lab Results   Component Value Date    WBC 17.89 (H) 09/03/2020    WBC 15.87 (H) 09/02/2020    WBC 15.8 (H) 12/24/2018         Lab Results   Component Value Date    [facility]       Culture Results:  Microbiology Results (last 10 days)       Procedure Component Value - Date/Time    Respiratory Culture - Sputum, Oropharynx [[id number]]  (Abnormal)  (Susceptibility) Collected:  09/03/20 0938    Lab Status:  Final result Specimen:  Sputum from Oropharynx Updated:  09/05/20 1059     Respiratory Culture Moderate growth (3+) Staphylococcus aureus, MRSA     Comment:   Methicillin resistant Staphylococcus aureus, Patient may be an isolation risk.         Rare Normal Respiratory Diana     Gram Stain Greater than 25 WBCs per low power field      Rare (1+) Epithelial cells per low power field      Many (4+) Mixed gram positive diana "    Susceptibility        Staphylococcus aureus, MRSA     NAVJOT     Clindamycin Resistant     Linezolid Susceptible     Oxacillin Resistant     Penicillin G Resistant     Tetracycline Resistant     Trimethoprim + Sulfamethoxazole Susceptible     Vancomycin Susceptible                              Dwaine Griffith, PharmD   09/05/20 16:17

## 2020-09-06 LAB
CREAT SERPL-MCNC: 0.26 MG/DL (ref 0.57–1)
GFR SERPL CREATININE-BSD FRML MDRD: >150 ML/MIN/1.73
GLUCOSE BLDC GLUCOMTR-MCNC: 185 MG/DL (ref 70–130)
GLUCOSE BLDC GLUCOMTR-MCNC: 189 MG/DL (ref 70–130)
GLUCOSE BLDC GLUCOMTR-MCNC: 239 MG/DL (ref 70–130)
GLUCOSE BLDC GLUCOMTR-MCNC: 259 MG/DL (ref 70–130)
MAGNESIUM SERPL-MCNC: 2.4 MG/DL (ref 1.6–2.6)
PHOSPHATE SERPL-MCNC: 3.9 MG/DL (ref 2.5–4.5)

## 2020-09-06 PROCEDURE — 25010000002 VANCOMYCIN: Performed by: INTERNAL MEDICINE

## 2020-09-06 PROCEDURE — 84100 ASSAY OF PHOSPHORUS: CPT | Performed by: INTERNAL MEDICINE

## 2020-09-06 PROCEDURE — 83735 ASSAY OF MAGNESIUM: CPT | Performed by: INTERNAL MEDICINE

## 2020-09-06 PROCEDURE — 25010000002 LORAZEPAM PER 2 MG: Performed by: INTERNAL MEDICINE

## 2020-09-06 PROCEDURE — 25010000002 HEPARIN (PORCINE) PER 1000 UNITS: Performed by: INTERNAL MEDICINE

## 2020-09-06 PROCEDURE — 92526 ORAL FUNCTION THERAPY: CPT

## 2020-09-06 PROCEDURE — 82565 ASSAY OF CREATININE: CPT | Performed by: INTERNAL MEDICINE

## 2020-09-06 PROCEDURE — 25010000002 HYDRALAZINE PER 20 MG: Performed by: NURSE PRACTITIONER

## 2020-09-06 PROCEDURE — 25010000002 METHYLPREDNISOLONE PER 125 MG: Performed by: INTERNAL MEDICINE

## 2020-09-06 PROCEDURE — 97110 THERAPEUTIC EXERCISES: CPT

## 2020-09-06 PROCEDURE — 82962 GLUCOSE BLOOD TEST: CPT

## 2020-09-06 PROCEDURE — 63710000001 INSULIN LISPRO (HUMAN) PER 5 UNITS: Performed by: NURSE PRACTITIONER

## 2020-09-06 NOTE — PROGRESS NOTES
"Pharmacy Dosing Service  Pharmacokinetics  Vancomycin Follow-up Evaluation    Assessment/Action/Plan:  Vancomycin trough = 7.8 at 22:36 last PM. Previous dose given at 11:20 yesterday AM per discussion with RN. Adjusted Vancomycin dose to 1500mg IV Q12h    InsightRX calculations:  Regimen: 1500 mg every 12 hours  Start time: 09:30 on 09/06/2020  Exposure target: AUC24 (range)400-600 mg/L.hr  AUC24,ss: 605 mg/L.hr  PAUC*: 96 %  Ctrough,ss: 19.1 mg/L  Pconc*: 44 %  Tox.: 16 %    Pharmacy will continue to monitor renal function and adjust dose accordingly.     Subjective:  Makenzie Norman is a 59 y.o. female currently on Vancomycin 1000 mg IV every 12 hours for the treatment of pneumonia, day 3 of therapy (Current vancomycin end date: 9-10-20).    Objective:  Ht: 157.5 cm (62.01\"); Wt: 98.9 kg (218 lb 0.6 oz)  Estimated Creatinine Clearance: 262.3 mL/min (A) (by C-G formula based on SCr of 0.26 mg/dL (L)).     Lab Results   Component Value Date    CREATININE 0.26 (L) 09/06/2020    CREATININE 0.32 (L) 09/03/2020    CREATININE 0.24 (L) 09/02/2020    CREATININE 0.58 (L) 09/01/2020    CREATININE 0.63 08/31/2020    CREATININE 0.51 (L) 08/30/2020      Lab Results   Component Value Date    WBC 17.89 (H) 09/03/2020    WBC 15.87 (H) 09/02/2020    WBC 15.8 (H) 12/24/2018         Lab Results   Component Value Date    VANCOTROUGH 7.80 09/05/2020       Culture Results:  Microbiology Results (last 10 days)       Procedure Component Value - Date/Time    Respiratory Culture - Sputum, Oropharynx [[id number]]  (Abnormal)  (Susceptibility) Collected:  09/03/20 0938    Lab Status:  Final result Specimen:  Sputum from Oropharynx Updated:  09/05/20 1059     Respiratory Culture Moderate growth (3+) Staphylococcus aureus, MRSA     Comment:   Methicillin resistant Staphylococcus aureus, Patient may be an isolation risk.         Rare Normal Respiratory Agustina     Gram Stain Greater than 25 WBCs per low power field      Rare (1+) Epithelial cells " per low power field      Many (4+) Mixed gram positive diana    Susceptibility        Staphylococcus aureus, MRSA     NAVJOT     Clindamycin Resistant     Linezolid Susceptible     Oxacillin Resistant     Penicillin G Resistant     Tetracycline Resistant     Trimethoprim + Sulfamethoxazole Susceptible     Vancomycin Susceptible                              Dwaine Griffith, PharmD   09/06/20 08:27

## 2020-09-06 NOTE — PROGRESS NOTES
Elmer Payne M.D.  [facility]        Internal Medicine Progress Note    9/6/2020   15:31    Name:  Makenzie Norman  MRN:    [id number]     Acct:     [id number]   Room:  [address] [place] [address] Day: 0     Admit Date: 9/1/2020  2:09 PM  PCP: Provider, No Known    Subjective:     C/C: need for continued oxygen weaning and nutrition support.     Interval History: Status:  stayed the same.  Sleeping in bed, opens eyes to command and attempts to follow commands.  No family at bedside.  Currently on cool aerosol 60%.  Afebrile.  Respiratory voiced concern of episode of stridor, currently not present.    Family is considering making patient comfort care.    Sumanth Joyce, oldest brother, ([phone])    Nicola Joyce, younger brother, POA        Review of Systems   Unable to perform ROS: mental status change         Medications:     Allergies:   Allergies   Allergen Reactions   • Valium [Diazepam] Unknown - Low Severity   • Xanax [Alprazolam] Unknown - Low Severity   • Morphine Anxiety       Current Meds:   Current Facility-Administered Medications:   •  acetaminophen (TYLENOL) tablet 650 mg, 650 mg, Oral, Q6H PRN **OR** acetaminophen (TYLENOL) suppository 650 mg, 650 mg, Rectal, Q4H PRN, Gerard Payne MD  •  Adult Standard Central TPN, , Intravenous, Q24H (TPN) **AND** Fat Emulsion Plant Based (INTRALIPID,LIPOSYN) 20 % infusion 50 g, 250 mL, Intravenous, Q24H (TPN), Gerard Payne MD  •  carvedilol (COREG) tablet 6.25 mg, 6.25 mg, Per G Tube, BID With Meals, Gerard Payne MD  •  clonazePAM (KlonoPIN) tablet 1 mg, 1 mg, Per G Tube, BID, Gerard Payne MD  •  dexmedetomidine HCl (PRECEDEX) 400 mcg in sodium chloride 0.9 % 100 mL (4 mcg/mL) infusion, 0.2-1.4 mcg/kg/hr, Intravenous, Titrated, Gerard Payne MD  •  dextrose (D50W) 25 g/ 50mL Intravenous Solution 25 g, 25 g, Intravenous, Q15 Min PRN, Gerard Payne MD  •  dextrose (GLUTOSE) oral gel 15 g, 15 g,  Oral, Q15 Min PRN, Gerard Payne MD  •  glucagon (human recombinant) (GLUCAGEN DIAGNOSTIC) injection 1 mg, 1 mg, Subcutaneous, Q15 Min PRN, Gerard Payne MD  •  guaiFENesin (ROBITUSSIN) 100 MG/5ML oral solution 200 mg, 200 mg, Per G Tube, Q6H, Gerard Payne MD  •  heparin (porcine) 5000 UNIT/ML injection 5,000 Units, 5,000 Units, Subcutaneous, Q8H, Gerard Payne MD  •  hydrALAZINE (APRESOLINE) injection 20 mg, 20 mg, Intravenous, Q8H, Lani Weldon APRN  •  insulin lispro (humaLOG) injection 0-24 Units, 0-24 Units, Subcutaneous, Q6H, Lani Weldon APRN  •  ipratropium-albuterol (DUO-NEB) nebulizer solution 3 mL, 3 mL, Nebulization, Q4H - RT, Gerard Payne MD  •  labetalol (NORMODYNE,TRANDATE) injection 20 mg, 20 mg, Intravenous, Q4H PRN, Gerard Payne MD  •  LORazepam (ATIVAN) injection 1 mg, 1 mg, Intravenous, Q4H PRN, Gerard Payne MD  •  methylPREDNISolone sodium succinate (SOLU-Medrol) injection 80 mg, 80 mg, Intravenous, Q8H, Gerard Payne MD  •  ondansetron (ZOFRAN) injection 4 mg, 4 mg, Intravenous, Q6H PRN, Gerard Payne MD  •  oxybutynin (DITROPAN) tablet 5 mg, 5 mg, Per G Tube, TID, Gerard Payne MD  •  pantoprazole (PROTONIX) injection 40 mg, 40 mg, Intravenous, Daily, Gerard Payne MD  •  risperiDONE (risperDAL) tablet 0.5 mg, 0.5 mg, Per G Tube, Nightly, Gerard Payne MD  •  saccharomyces boulardii (FLORASTOR) capsule 250 mg, 250 mg, Oral, BID, Gerard Payne MD  •  sodium chloride 0.9 % flush 10 mL, 10 mL, Intravenous, Q12H, Gerard Payne MD  •  sodium chloride 0.9 % flush 10 mL, 10 mL, Intravenous, Q12H, Gerard Payne MD  •  sodium chloride 0.9 % flush 10 mL, 10 mL, Intravenous, Q12H, Gerard Payne MD  •  sodium chloride 0.9 % flush 10 mL, 10 mL, Intravenous, PRN, Gerard Payne MD  •  sodium chloride 0.9 % flush 20 mL, 20 mL,  "Intravenous, PRN, Gerard Payne MD  •  sodium chloride nasal spray 1 spray, 1 spray, Each Nare, PRN, Gerard Payne MD  •  vancomycin 1500 mg/500 mL 0.9% NS IVPB ([facility]), 1,500 mg, Intravenous, Q12H, Gerard Payne MD  •  Laurie's amazing butt cream, , Topical, BID, Disha Longo APRN  •  Laurie's amazing butt cream, , Topical, PRN, Disha Longo APRN    Data:     Code Status:    There are no questions and answers to display.       No family history on file.    Social History     Socioeconomic History   • Marital status: Unknown     Spouse name: Not on file   • Number of children: Not on file   • Years of education: Not on file   • Highest education level: Not on file       Vitals:  Ht 157.5 cm (62.01\")   Wt 98.9 kg (218 lb 0.6 oz)   BMI 39.87 kg/m²   T 98.6 P 70 R 16 /62 Sp02 98% (cool aerosol 60%)      I/O (24Hr):  No intake or output data in the 24 hours ending 09/06/20 1531    Labs and imaging:      Recent Results (from the past 12 hour(s))   Magnesium    Collection Time: 09/06/20  4:43 AM   Result Value Ref Range    Magnesium 2.4 1.6 - 2.6 mg/dL   Phosphorus    Collection Time: 09/06/20  4:43 AM   Result Value Ref Range    Phosphorus 3.9 2.5 - 4.5 mg/dL   Creatinine, Serum    Collection Time: 09/06/20  4:43 AM   Result Value Ref Range    Creatinine 0.26 (L) 0.57 - 1.00 mg/dL    eGFR Non African Amer >150 >60 mL/min/1.73   POC Glucose Once    Collection Time: 09/06/20  5:02 AM   Result Value Ref Range    Glucose 239 (H) 70 - 130 mg/dL   POC Glucose Once    Collection Time: 09/06/20 11:40 AM   Result Value Ref Range    Glucose 259 (H) 70 - 130 mg/dL         Physical Examination:        Physical Exam   Constitutional: She appears well-developed and well-nourished.   HENT:   Head: Normocephalic and atraumatic.   Nose: Nose normal.   Facial edema  MM dry   Eyes: Pupils are equal, round, and reactive to light. Conjunctivae and EOM are normal.   Neck: Normal range " of motion. Neck supple.   Cardiovascular: Normal rate, regular rhythm, normal heart sounds and intact distal pulses.   Pulmonary/Chest: Effort normal and breath sounds normal.   Abdominal: Soft. Bowel sounds are normal.   obese   Musculoskeletal:   Generalized weakness  Left hemiplegia   Neurological:   Open eyes to verbal command  No comprehensible speech   Skin: Skin is warm and dry.   Nursing note and vitals reviewed.        Assessment:            * No active hospital problems. *    Past Medical History:   Diagnosis Date   • Anxiety    • Asthma    • Brain injury (CMS/HCC)    • Bronchitis    • Dysphagia    • GERD (gastroesophageal reflux disease)    • Hypertension    • Obesity    • Pseudobulbar affect    • Rheumatoid arthritis (CMS/HCC)         Plan:        1. Acute hypoxic/hypercapneic respiratory failure  2. DM2 with hyperglycemia  3. Dysphagia  4. HTN  5. Late effects of previous CVA  6. GERD  7. Anxiety  8. RA  9. Morbid obesity  10. S/p aspiration event  11. S/p respiratory arrest  12. Pseudobulbar affect  13. Hypernatremia  14. MRSA sputum    Continue current treatment. Monitor counts. Increase activity as tolerated. Continue nutrition support via AMONs. Continue TPN for now - family does not wish to pursue feeding tube placement. Labs Tuesday. Maintain patient safety. Oxygen weaning as tolerated. Wean precedex and dc.  Receiving vancomycin for MRSA in the sputum.  Added racemic epi aerosol as needed for stridor.      Electronically signed by BEAU Stone on 9/6/2020 at 15:31

## 2020-09-07 LAB
CA-I BLD-MCNC: 4.43 MG/DL (ref 4.6–5.4)
CRP SERPL-MCNC: 0.73 MG/DL (ref 0–0.5)
GLUCOSE BLDC GLUCOMTR-MCNC: 203 MG/DL (ref 70–130)
GLUCOSE BLDC GLUCOMTR-MCNC: 231 MG/DL (ref 70–130)
GLUCOSE BLDC GLUCOMTR-MCNC: 231 MG/DL (ref 70–130)
Lab: ABNORMAL
PREALB SERPL-MCNC: 32.2 MG/DL (ref 20–40)
TRIGL SERPL-MCNC: 229 MG/DL (ref 0–150)
VANCOMYCIN TROUGH SERPL-MCNC: 19.8 MCG/ML (ref 5–20)
WHOLE BLOOD HOLD SPECIMEN: NORMAL

## 2020-09-07 PROCEDURE — 25010000002 HEPARIN (PORCINE) PER 1000 UNITS: Performed by: INTERNAL MEDICINE

## 2020-09-07 PROCEDURE — 97535 SELF CARE MNGMENT TRAINING: CPT | Performed by: OCCUPATIONAL THERAPIST

## 2020-09-07 PROCEDURE — 25010000002 METHYLPREDNISOLONE PER 125 MG: Performed by: INTERNAL MEDICINE

## 2020-09-07 PROCEDURE — 84478 ASSAY OF TRIGLYCERIDES: CPT | Performed by: INTERNAL MEDICINE

## 2020-09-07 PROCEDURE — 82330 ASSAY OF CALCIUM: CPT

## 2020-09-07 PROCEDURE — 25010000002 HYDRALAZINE PER 20 MG: Performed by: NURSE PRACTITIONER

## 2020-09-07 PROCEDURE — 63710000001 INSULIN LISPRO (HUMAN) PER 5 UNITS: Performed by: NURSE PRACTITIONER

## 2020-09-07 PROCEDURE — 80202 ASSAY OF VANCOMYCIN: CPT | Performed by: INTERNAL MEDICINE

## 2020-09-07 PROCEDURE — 82962 GLUCOSE BLOOD TEST: CPT

## 2020-09-07 PROCEDURE — 25010000002 VANCOMYCIN: Performed by: INTERNAL MEDICINE

## 2020-09-07 PROCEDURE — 84134 ASSAY OF PREALBUMIN: CPT | Performed by: INTERNAL MEDICINE

## 2020-09-07 PROCEDURE — 86140 C-REACTIVE PROTEIN: CPT | Performed by: INTERNAL MEDICINE

## 2020-09-07 NOTE — PROGRESS NOTES
"Pharmacy Dosing Service  Pharmacokinetics  Vancomycin Follow-up Evaluation    Assessment/Action/Plan:  Patient is currently receiving Vancomycin 1500 mg IV every 12 hours for the treatment of Pneumonia, day 4 of therapy. Current vancomycin end date: 9/10/20. Labs/dose reviewed. Ordered trough level for tonight at 2030 (prior to 4th adjusted dose). With the guidance of bayesian population kinetics through [employer]ight[employer]:    Regimen: 1500 mg every 12 hours for 8 doses.  Start time: 14:41 on 09/07/2020  Exposure target: AUC24 (range)400-600 mg/L.hr  AUC24,ss: 494 mg/L.hr  PAUC*: 86 %  Ctrough,ss: 11.0 mg/L  Pconc*: 0 %  Tox.: 7 %    Pharmacy will continue to monitor renal function and adjust dose accordingly.     Subjective:  Makenzie Norman is a 59 y.o. female    Objective:  Ht: 157.5 cm (62.01\"); Wt: 108 kg (238 lb 1.6 oz)  Estimated Creatinine Clearance: 276.2 mL/min (A) (by C-G formula based on SCr of 0.26 mg/dL (L)).   Lab Results   Component Value Date    CREATININE 0.26 (L) 09/06/2020    CREATININE 0.32 (L) 09/03/2020    CREATININE 0.24 (L) 09/02/2020    CREATININE 0.58 (L) 09/01/2020    CREATININE 0.63 08/31/2020    CREATININE 0.51 (L) 08/30/2020      Lab Results   Component Value Date    WBC 17.89 (H) 09/03/2020    WBC 15.87 (H) 09/02/2020    WBC 15.8 (H) 12/24/2018         Lab Results   Component Value Date    VANCOTROUGH 7.80 09/05/2020       Culture Results:  Microbiology Results (last 10 days)       Procedure Component Value - Date/Time    Respiratory Culture - Sputum, Oropharynx [[id number]]  (Abnormal)  (Susceptibility) Collected:  09/03/20 0938    Lab Status:  Final result Specimen:  Sputum from Oropharynx Updated:  09/05/20 1059     Respiratory Culture Moderate growth (3+) Staphylococcus aureus, MRSA     Comment:   Methicillin resistant Staphylococcus aureus, Patient may be an isolation risk.         Rare Normal Respiratory Agustina     Gram Stain Greater than 25 WBCs per low power field      Rare (1+) " Epithelial cells per low power field      Many (4+) Mixed gram positive diana    Susceptibility        Staphylococcus aureus, MRSA     NAVJOT     Clindamycin Resistant     Linezolid Susceptible     Oxacillin Resistant     Penicillin G Resistant     Tetracycline Resistant     Trimethoprim + Sulfamethoxazole Susceptible     Vancomycin Susceptible                            Antimicrobials:  Anti-Infectives (From admission, onward)      Ordered     Dose/Rate Route Frequency Start Stop    09/06/20 0826  vancomycin 1500 mg/500 mL 0.9% NS IVPB ([facility])  Review   Ordering Provider:  Gerard Payne MD    1,500 mg  over 180 Minutes Intravenous Every 12 Hours 09/06/20 0930 09/11/20 0929            Silvino Madera, IrinaD   09/07/20 13:46

## 2020-09-08 LAB
ANION GAP SERPL CALCULATED.3IONS-SCNC: 9 MMOL/L (ref 5–15)
BASOPHILS # BLD AUTO: 0.06 10*3/MM3 (ref 0–0.2)
BASOPHILS NFR BLD AUTO: 0.3 % (ref 0–1.5)
BUN SERPL-MCNC: 43 MG/DL (ref 6–20)
BUN/CREAT SERPL: 107.5 (ref 7–25)
CALCIUM SPEC-SCNC: 8.3 MG/DL (ref 8.6–10.5)
CHLORIDE SERPL-SCNC: 105 MMOL/L (ref 98–107)
CO2 SERPL-SCNC: 28 MMOL/L (ref 22–29)
CREAT SERPL-MCNC: 0.4 MG/DL (ref 0.57–1)
CRP SERPL-MCNC: 0.77 MG/DL (ref 0–0.5)
DEPRECATED RDW RBC AUTO: 43.1 FL (ref 37–54)
EOSINOPHIL # BLD AUTO: 0 10*3/MM3 (ref 0–0.4)
EOSINOPHIL NFR BLD AUTO: 0 % (ref 0.3–6.2)
ERYTHROCYTE [DISTWIDTH] IN BLOOD BY AUTOMATED COUNT: 13.1 % (ref 12.3–15.4)
ERYTHROCYTE [SEDIMENTATION RATE] IN BLOOD: 10 MM/HR (ref 0–20)
GFR SERPL CREATININE-BSD FRML MDRD: >150 ML/MIN/1.73
GLUCOSE BLDC GLUCOMTR-MCNC: 176 MG/DL (ref 70–130)
GLUCOSE BLDC GLUCOMTR-MCNC: 211 MG/DL (ref 70–130)
GLUCOSE BLDC GLUCOMTR-MCNC: 213 MG/DL (ref 70–130)
GLUCOSE BLDC GLUCOMTR-MCNC: 238 MG/DL (ref 70–130)
GLUCOSE SERPL-MCNC: 271 MG/DL (ref 65–99)
HCT VFR BLD AUTO: 36.4 % (ref 34–46.6)
HGB BLD-MCNC: 11.8 G/DL (ref 12–15.9)
IMM GRANULOCYTES # BLD AUTO: 0.89 10*3/MM3 (ref 0–0.05)
IMM GRANULOCYTES NFR BLD AUTO: 4.7 % (ref 0–0.5)
LYMPHOCYTES # BLD AUTO: 0.42 10*3/MM3 (ref 0.7–3.1)
LYMPHOCYTES NFR BLD AUTO: 2.2 % (ref 19.6–45.3)
MAGNESIUM SERPL-MCNC: 2.4 MG/DL (ref 1.6–2.6)
MCH RBC QN AUTO: 29.1 PG (ref 26.6–33)
MCHC RBC AUTO-ENTMCNC: 32.4 G/DL (ref 31.5–35.7)
MCV RBC AUTO: 89.7 FL (ref 79–97)
MONOCYTES # BLD AUTO: 1.34 10*3/MM3 (ref 0.1–0.9)
MONOCYTES NFR BLD AUTO: 7.1 % (ref 5–12)
NEUTROPHILS NFR BLD AUTO: 16.25 10*3/MM3 (ref 1.7–7)
NEUTROPHILS NFR BLD AUTO: 85.7 % (ref 42.7–76)
NRBC BLD AUTO-RTO: 0 /100 WBC (ref 0–0.2)
NT-PROBNP SERPL-MCNC: 243.3 PG/ML (ref 0–900)
PHOSPHATE SERPL-MCNC: 3.4 MG/DL (ref 2.5–4.5)
PLATELET # BLD AUTO: 121 10*3/MM3 (ref 140–450)
PMV BLD AUTO: 13.2 FL (ref 6–12)
POTASSIUM SERPL-SCNC: 4.4 MMOL/L (ref 3.5–5.2)
RBC # BLD AUTO: 4.06 10*6/MM3 (ref 3.77–5.28)
SODIUM SERPL-SCNC: 142 MMOL/L (ref 136–145)
VANCOMYCIN TROUGH SERPL-MCNC: 14.9 MCG/ML (ref 5–20)
WBC # BLD AUTO: 18.96 10*3/MM3 (ref 3.4–10.8)

## 2020-09-08 PROCEDURE — 83880 ASSAY OF NATRIURETIC PEPTIDE: CPT | Performed by: INTERNAL MEDICINE

## 2020-09-08 PROCEDURE — 84100 ASSAY OF PHOSPHORUS: CPT | Performed by: INTERNAL MEDICINE

## 2020-09-08 PROCEDURE — 25010000002 LORAZEPAM PER 2 MG: Performed by: INTERNAL MEDICINE

## 2020-09-08 PROCEDURE — 97110 THERAPEUTIC EXERCISES: CPT

## 2020-09-08 PROCEDURE — 86140 C-REACTIVE PROTEIN: CPT | Performed by: INTERNAL MEDICINE

## 2020-09-08 PROCEDURE — 82962 GLUCOSE BLOOD TEST: CPT

## 2020-09-08 PROCEDURE — 80202 ASSAY OF VANCOMYCIN: CPT | Performed by: INTERNAL MEDICINE

## 2020-09-08 PROCEDURE — 25010000002 METHYLPREDNISOLONE PER 125 MG: Performed by: INTERNAL MEDICINE

## 2020-09-08 PROCEDURE — 25010000002 HEPARIN (PORCINE) PER 1000 UNITS: Performed by: INTERNAL MEDICINE

## 2020-09-08 PROCEDURE — 63710000001 INSULIN DETEMIR PER 5 UNITS: Performed by: NURSE PRACTITIONER

## 2020-09-08 PROCEDURE — 80048 BASIC METABOLIC PNL TOTAL CA: CPT | Performed by: INTERNAL MEDICINE

## 2020-09-08 PROCEDURE — 25010000002 HYDRALAZINE PER 20 MG: Performed by: NURSE PRACTITIONER

## 2020-09-08 PROCEDURE — 63710000001 INSULIN LISPRO (HUMAN) PER 5 UNITS: Performed by: NURSE PRACTITIONER

## 2020-09-08 PROCEDURE — 83735 ASSAY OF MAGNESIUM: CPT | Performed by: INTERNAL MEDICINE

## 2020-09-08 PROCEDURE — 85651 RBC SED RATE NONAUTOMATED: CPT | Performed by: INTERNAL MEDICINE

## 2020-09-08 PROCEDURE — 85025 COMPLETE CBC W/AUTO DIFF WBC: CPT | Performed by: INTERNAL MEDICINE

## 2020-09-08 RX ORDER — METHYLPREDNISOLONE SODIUM SUCCINATE 40 MG/ML
40 INJECTION, POWDER, LYOPHILIZED, FOR SOLUTION INTRAMUSCULAR; INTRAVENOUS EVERY 12 HOURS
Status: DISCONTINUED | OUTPATIENT
Start: 2020-09-09 | End: 2020-09-14

## 2020-09-08 RX ORDER — LANSOPRAZOLE
30 KIT DAILY
Status: DISCONTINUED | OUTPATIENT
Start: 2020-09-09 | End: 2020-09-21

## 2020-09-08 RX ORDER — HYDRALAZINE HYDROCHLORIDE 25 MG/1
25 TABLET, FILM COATED ORAL EVERY 8 HOURS SCHEDULED
Status: DISCONTINUED | OUTPATIENT
Start: 2020-09-08 | End: 2020-09-21

## 2020-09-08 RX ORDER — LORAZEPAM 2 MG/ML
1 INJECTION INTRAMUSCULAR EVERY 4 HOURS PRN
Status: DISPENSED | OUTPATIENT
Start: 2020-09-08 | End: 2020-09-15

## 2020-09-08 NOTE — PROGRESS NOTES
Elmer Payne M.D.  [facility]        Internal Medicine Progress Note    9/8/2020   12:42    Name:  Makenzie Norman  MRN:    [id number]     Acct:     [id number]   Room:  [address] [place] [address] Day: 0     Admit Date: 9/1/2020  2:09 PM  PCP: Provider, No Known    Subjective:     C/C: need for continued oxygen weaning and nutrition support.     Interval History: Status:  stayed the same.  Resting in bed. No family at bedside. Alert and interacting with staff. Confused, but at baseline. Tolerating 02 at 3lpm with sats in low 90s. Coarse lung sounds. Slight increase in WBC. Counts otherwise normal. Afebrile. Abdomen distended.          Review of Systems   Unable to perform ROS: mental status change         Medications:     Allergies:   Allergies   Allergen Reactions   • Valium [Diazepam] Unknown - Low Severity   • Xanax [Alprazolam] Unknown - Low Severity   • Morphine Anxiety       Current Meds:   Current Facility-Administered Medications:   •  acetaminophen (TYLENOL) tablet 650 mg, 650 mg, Oral, Q6H PRN **OR** acetaminophen (TYLENOL) suppository 650 mg, 650 mg, Rectal, Q4H PRN, Gerard Payne MD  •  Adult Standard Central TPN, , Intravenous, Q24H (TPN) **AND** Fat Emulsion Plant Based (INTRALIPID,LIPOSYN) 20 % infusion 50 g, 250 mL, Intravenous, Q24H (TPN), Gerard Payne MD  •  carvedilol (COREG) tablet 6.25 mg, 6.25 mg, Per G Tube, BID With Meals, Gerard Payne MD  •  clonazePAM (KlonoPIN) tablet 1 mg, 1 mg, Per G Tube, BID, Gerard Payne MD  •  dexmedetomidine HCl (PRECEDEX) 400 mcg in sodium chloride 0.9 % 100 mL (4 mcg/mL) infusion, 0.2-1.4 mcg/kg/hr, Intravenous, Titrated, Gerard Payne MD  •  dextrose (D50W) 25 g/ 50mL Intravenous Solution 25 g, 25 g, Intravenous, Q15 Min PRN, Gerard Payne MD  •  dextrose (GLUTOSE) oral gel 15 g, 15 g, Oral, Q15 Min PRN, Gerard Payne MD  •  glucagon (human recombinant) (GLUCAGEN DIAGNOSTIC) injection  1 mg, 1 mg, Subcutaneous, Q15 Min PRN, Gerard Payne MD  •  guaiFENesin (ROBITUSSIN) 100 MG/5ML oral solution 200 mg, 200 mg, Per G Tube, Q6H, Gerard Payne MD  •  heparin (porcine) 5000 UNIT/ML injection 5,000 Units, 5,000 Units, Subcutaneous, Q8H, Gerard Payne MD  •  hydrALAZINE (APRESOLINE) injection 20 mg, 20 mg, Intravenous, Q8H, Lani Weldon APRN  •  insulin lispro (humaLOG) injection 0-24 Units, 0-24 Units, Subcutaneous, Q6H, Lani Weldon APRN  •  ipratropium-albuterol (DUO-NEB) nebulizer solution 3 mL, 3 mL, Nebulization, Q4H - RT, Gerard Payne MD  •  labetalol (NORMODYNE,TRANDATE) injection 20 mg, 20 mg, Intravenous, Q4H PRN, Gerard Payne MD  •  LORazepam (ATIVAN) injection 1 mg, 1 mg, Intravenous, Q4H PRN, Gerard Payne MD  •  methylPREDNISolone sodium succinate (SOLU-Medrol) injection 80 mg, 80 mg, Intravenous, Q8H, Gerard Payne MD  •  ondansetron (ZOFRAN) injection 4 mg, 4 mg, Intravenous, Q6H PRN, Gerard Payne MD  •  oxybutynin (DITROPAN) tablet 5 mg, 5 mg, Per G Tube, TID, Gerard Payne MD  •  pantoprazole (PROTONIX) injection 40 mg, 40 mg, Intravenous, Daily, Gerard Payne MD  •  racemic epinephrine (RACEPINEPHRINE) nebulizer solution 0.5 mL, 0.5 mL, Nebulization, Q4H PRN, Marylu Marc APRN  •  risperiDONE (risperDAL) tablet 0.5 mg, 0.5 mg, Per G Tube, Nightly, Gerard Payne MD  •  saccharomyces boulardii (FLORASTOR) capsule 250 mg, 250 mg, Oral, BID, Gerard Payne MD  •  sodium chloride 0.9 % flush 10 mL, 10 mL, Intravenous, Q12H, Gerard Payne MD  •  sodium chloride 0.9 % flush 10 mL, 10 mL, Intravenous, Q12H, Gerard Payne MD  •  sodium chloride 0.9 % flush 10 mL, 10 mL, Intravenous, Q12H, Gerard Payne MD  •  sodium chloride 0.9 % flush 10 mL, 10 mL, Intravenous, PRN, Gerard Payne MD  •  sodium chloride 0.9 % flush 20 mL, 20 mL,  "Intravenous, PRN, Gerard Payne MD  •  sodium chloride nasal spray 1 spray, 1 spray, Each Nare, PRN, Gerard Payne MD  •  vancomycin 1 g/250 mL 0.9% NS (vial-mate), 1,000 mg, Intravenous, Q12H, Gerard Payne MD  •  Laurie's amazing butt cream, , Topical, BID, Disha Longo APRN  •  Laurie's amazing butt cream, , Topical, PRN, Disha Longo APRN    Data:     Code Status:    There are no questions and answers to display.       No family history on file.    Social History     Socioeconomic History   • Marital status: Unknown     Spouse name: Not on file   • Number of children: Not on file   • Years of education: Not on file   • Highest education level: Not on file       Vitals:  Ht 157.5 cm (62.01\")   Wt 108 kg (238 lb 1.6 oz)   BMI 43.54 kg/m²   T 97.3 P 94 R 15 /78 Sp02 91% (3 lpm)      I/O (24Hr):  No intake or output data in the 24 hours ending 09/08/20 1242    Labs and imaging:      Recent Results (from the past 12 hour(s))   Magnesium    Collection Time: 09/08/20  4:25 AM   Result Value Ref Range    Magnesium 2.4 1.6 - 2.6 mg/dL   Phosphorus    Collection Time: 09/08/20  4:25 AM   Result Value Ref Range    Phosphorus 3.4 2.5 - 4.5 mg/dL   Basic Metabolic Panel    Collection Time: 09/08/20  4:25 AM   Result Value Ref Range    Glucose 271 (H) 65 - 99 mg/dL    BUN 43 (H) 6 - 20 mg/dL    Creatinine 0.40 (L) 0.57 - 1.00 mg/dL    Sodium 142 136 - 145 mmol/L    Potassium 4.4 3.5 - 5.2 mmol/L    Chloride 105 98 - 107 mmol/L    CO2 28.0 22.0 - 29.0 mmol/L    Calcium 8.3 (L) 8.6 - 10.5 mg/dL    eGFR Non African Amer >150 >60 mL/min/1.73    BUN/Creatinine Ratio 107.5 (H) 7.0 - 25.0    Anion Gap 9.0 5.0 - 15.0 mmol/L   BNP    Collection Time: 09/08/20  4:25 AM   Result Value Ref Range    proBNP 243.3 0.0 - 900.0 pg/mL   C-reactive Protein    Collection Time: 09/08/20  4:25 AM   Result Value Ref Range    C-Reactive Protein 0.77 (H) 0.00 - 0.50 mg/dL   Sedimentation " Rate    Collection Time: 09/08/20  4:26 AM   Result Value Ref Range    Sed Rate 10 0 - 20 mm/hr   CBC Auto Differential    Collection Time: 09/08/20  4:26 AM   Result Value Ref Range    WBC 18.96 (H) 3.40 - 10.80 10*3/mm3    RBC 4.06 3.77 - 5.28 10*6/mm3    Hemoglobin 11.8 (L) 12.0 - 15.9 g/dL    Hematocrit 36.4 34.0 - 46.6 %    MCV 89.7 79.0 - 97.0 fL    MCH 29.1 26.6 - 33.0 pg    MCHC 32.4 31.5 - 35.7 g/dL    RDW 13.1 12.3 - 15.4 %    RDW-SD 43.1 37.0 - 54.0 fl    MPV 13.2 (H) 6.0 - 12.0 fL    Platelets 121 (L) 140 - 450 10*3/mm3    Neutrophil % 85.7 (H) 42.7 - 76.0 %    Lymphocyte % 2.2 (L) 19.6 - 45.3 %    Monocyte % 7.1 5.0 - 12.0 %    Eosinophil % 0.0 (L) 0.3 - 6.2 %    Basophil % 0.3 0.0 - 1.5 %    Immature Grans % 4.7 (H) 0.0 - 0.5 %    Neutrophils, Absolute 16.25 (H) 1.70 - 7.00 10*3/mm3    Lymphocytes, Absolute 0.42 (L) 0.70 - 3.10 10*3/mm3    Monocytes, Absolute 1.34 (H) 0.10 - 0.90 10*3/mm3    Eosinophils, Absolute 0.00 0.00 - 0.40 10*3/mm3    Basophils, Absolute 0.06 0.00 - 0.20 10*3/mm3    Immature Grans, Absolute 0.89 (H) 0.00 - 0.05 10*3/mm3    nRBC 0.0 0.0 - 0.2 /100 WBC   POC Glucose Once    Collection Time: 09/08/20  5:31 AM   Result Value Ref Range    Glucose 211 (H) 70 - 130 mg/dL   POC Glucose Once    Collection Time: 09/08/20 11:34 AM   Result Value Ref Range    Glucose 213 (H) 70 - 130 mg/dL         Physical Examination:        Physical Exam   Constitutional: She appears well-developed and well-nourished.   HENT:   Head: Normocephalic and atraumatic.   Nose: Nose normal.   MM dry  DHT to left nare   Eyes: Pupils are equal, round, and reactive to light. Conjunctivae and EOM are normal.   Neck: Normal range of motion. Neck supple.   Cardiovascular: Normal rate, regular rhythm, normal heart sounds and intact distal pulses.   Pulmonary/Chest: Effort normal and breath sounds normal.   Abdominal: Soft. Bowel sounds are normal.   Obese  Distended with decreased bowel sounds   Musculoskeletal:    Generalized weakness  Left hemiplegia   Neurological: She is alert.   Eyes open and tracking  Interacting appropriately with staff  Confused   Skin: Skin is warm and dry.   Scattered bruising to extremities   Psychiatric: She has a normal mood and affect. Her behavior is normal.   Nursing note and vitals reviewed.        Assessment:            * No active hospital problems. *    Past Medical History:   Diagnosis Date   • Anxiety    • Asthma    • Brain injury (CMS/HCC)    • Bronchitis    • Dysphagia    • GERD (gastroesophageal reflux disease)    • Hypertension    • Obesity    • Pseudobulbar affect    • Rheumatoid arthritis (CMS/HCC)         Plan:        1. Acute hypoxic/hypercapneic respiratory failure  2. DM2 with hyperglycemia  3. Dysphagia  4. HTN  5. Late effects of previous CVA  6. GERD  7. Anxiety  8. RA  9. Morbid obesity  10. S/p aspiration event  11. S/p respiratory arrest  12. Pseudobulbar affect  13. Hypernatremia  14. MRSA sputum    Continue current treatment. Monitor counts. Increase activity as tolerated. Continue nutrition support via AMONs. Continue TPN for now - family does not wish to pursue feeding tube placement. Labs Thursday. Maintain patient safety. Oxygen weaning as tolerated.  Wean steroids. Change protonix to per g tube.       Electronically signed by BEAU Sheppard on 9/8/2020 at 12:42

## 2020-09-08 NOTE — PROGRESS NOTES
"Pharmacy Dosing Service  Pharmacokinetics  Vancomycin Follow-up Evaluation    Assessment/Action/Plan:  Current order: Vancomycin 1000 mg IV every 12 hours. Dose adjusted from 1500 mg IV Every 12 hours yesterday due to trough 19.8 approx 11 hours post-dose.    Reviewing regimen for today, [employer] predictions are for another ULN vancomycin level. Patient has received two doses of 1000 mg since order change. Will order follow up trough for tonight prior to dose    Pharmacy will continue to monitor renal function and adjust dose accordingly.     Subjective:  Makenzie Norman is a 59 y.o. female currently on Vancomycin 1000 mg IV every 12 hours for the treatment of pneumonia, day 5 of therapy (Current vancomycin end date: 9/10/20).    Objective:  Ht: 157.5 cm (62.01\"); Wt: 108 kg (238 lb 1.6 oz)  Estimated Creatinine Clearance: 179.6 mL/min (A) (by C-G formula based on SCr of 0.4 mg/dL (L)).   Lab Results   Component Value Date    CREATININE 0.40 (L) 09/08/2020    CREATININE 0.26 (L) 09/06/2020    CREATININE 0.32 (L) 09/03/2020    CREATININE 0.58 (L) 09/01/2020    CREATININE 0.63 08/31/2020    CREATININE 0.51 (L) 08/30/2020      Lab Results   Component Value Date    WBC 18.96 (H) 09/08/2020    WBC 17.89 (H) 09/03/2020    WBC 15.87 (H) 09/02/2020         Lab Results   Component Value Date    VANCOTROUGH 19.80 09/07/2020       Culture Results:  Microbiology Results (last 10 days)       Procedure Component Value - Date/Time    Fungus Culture - Sputum, Oropharynx [[id number]] Collected:  09/03/20 0942    Lab Status:  Preliminary result Specimen:  Sputum from Oropharynx Updated:  09/08/20 1000     Fungus Culture No fungus isolated at less than 1 week    Respiratory Culture - Sputum, Oropharynx [[id number]]  (Abnormal)  (Susceptibility) Collected:  09/03/20 0938    Lab Status:  Final result Specimen:  Sputum from Oropharynx Updated:  09/05/20 1059     Respiratory Culture Moderate growth (3+) Staphylococcus aureus, MRSA     " Comment:   Methicillin resistant Staphylococcus aureus, Patient may be an isolation risk.         Rare Normal Respiratory Diana     Gram Stain Greater than 25 WBCs per low power field      Rare (1+) Epithelial cells per low power field      Many (4+) Mixed gram positive diana    Susceptibility        Staphylococcus aureus, MRSA     NAVJOT     Clindamycin Resistant     Linezolid Susceptible     Oxacillin Resistant     Penicillin G Resistant     Tetracycline Resistant     Trimethoprim + Sulfamethoxazole Susceptible     Vancomycin Susceptible                              Willie Benítez, PharmD   09/08/20 17:55

## 2020-09-08 NOTE — PROGRESS NOTES
"Pharmacy Dosing Service  Pharmacokinetics  Vancomycin Follow-up Evaluation    Assessment/Action/Plan:  Patient is currently receiving Vancomycin 1500 mg iv every 12 hours for the treatment of Pneumonia day 4 of therapy. Trough obtained today (9/7/2020) at 2031 ~ 11 hours post dose. Changed Vancomycin dose to 1000 mg iv every 12 hours. Current Vancomycin end date: 09/10/2020. With the guidance of bayesian population kinetics through [employer]:    Loading dose: N/A  Regimen: 1000 mg every 12 hours for 7 doses.  Start time: 22:12 on 09/07/2020  Exposure target: AUC24 (range)400-600 mg/L.hr  AUC24,ss: 527 mg/L.hr  PAUC*: 94 %  Ctrough,ss: 19.8 mg/L  Pconc*: 48 %  Tox.: 17 %    Pharmacy will continue to monitor renal function and adjust dose accordingly.     Subjective:  Makenzie Norman is a 59 y.o. female currently on Vancomycin 1000 mg IV every 12 hours for the treatment of pneumonia, day 4 of therapy (Current vancomycin end date: 09/10/2020).    Objective:  Ht: 157.5 cm (62.01\"); Wt: 108 kg (238 lb 1.6 oz)  Estimated Creatinine Clearance: 276.2 mL/min (A) (by C-G formula based on SCr of 0.26 mg/dL (L)).   Lab Results   Component Value Date    CREATININE 0.26 (L) 09/06/2020    CREATININE 0.32 (L) 09/03/2020    CREATININE 0.24 (L) 09/02/2020    CREATININE 0.58 (L) 09/01/2020    CREATININE 0.63 08/31/2020    CREATININE 0.51 (L) 08/30/2020      Lab Results   Component Value Date    WBC 17.89 (H) 09/03/2020    WBC 15.87 (H) 09/02/2020    WBC 15.8 (H) 12/24/2018         Lab Results   Component Value Date    VANCOTROUGH 19.80 09/07/2020       Culture Results:  Microbiology Results (last 10 days)       Procedure Component Value - Date/Time    Respiratory Culture - Sputum, Oropharynx [[id number]]  (Abnormal)  (Susceptibility) Collected:  09/03/20 0938    Lab Status:  Final result Specimen:  Sputum from Oropharynx Updated:  09/05/20 1059     Respiratory Culture Moderate growth (3+) Staphylococcus aureus, MRSA     Comment: "   Methicillin resistant Staphylococcus aureus, Patient may be an isolation risk.         Rare Normal Respiratory Diana     Gram Stain Greater than 25 WBCs per low power field      Rare (1+) Epithelial cells per low power field      Many (4+) Mixed gram positive diana    Susceptibility        Staphylococcus aureus, MRSA     NAVJOT     Clindamycin Resistant     Linezolid Susceptible     Oxacillin Resistant     Penicillin G Resistant     Tetracycline Resistant     Trimethoprim + Sulfamethoxazole Susceptible     Vancomycin Susceptible                              Fiordaliza Fowler, PharmD   09/07/20 21:22

## 2020-09-08 NOTE — PHARMACY RECOMMENDATION
"LTACH Fall Assessment Note    Makenzie Norman is a 59 y.o.female  [Ht: 157.5 cm (62.01\"); Wt: 108 kg (238 lb 1.6 oz)] admitted 9/1/2020  2:09 PM.    Current medications associated with an increased risk for fall include:    Current Facility-Administered Medications:     carvedilol (COREG)     clonazePAM (KlonoPIN)     dexmedetomidine HCl (PRECEDEX)     hydrALAZINE (APRESOLINE)    insulin lispro (humaLOG)     labetalol (NORMODYNE,TRANDATE)     LORazepam (ATIVAN)     ondansetron (ZOFRAN)    oxybutynin (DITROPAN)    risperiDONE (risperDAL)      Willie Benítez, PharmD  09/08/20  11:07       "

## 2020-09-08 NOTE — PROGRESS NOTES
Elmer Payne M.D.  [facility]        Internal Medicine Progress Note    9/7/2020   20:05    Name:  Makenzie Norman  MRN:    [id number]     Acct:     [id number]   Room:  [address] [place] [address] Day: 0     Admit Date: 9/1/2020  2:09 PM  PCP: Provider, No Known    Subjective:     C/C: need for continued oxygen weaning and nutrition support.     Interval History: Status:  stayed the same.  Sleeping in bed, opens eyes to command and attempts to follow commands.  No family at bedside.  Currently seen on bipap.  Staff denies concerns at present.     Family is considering making patient comfort care.    Sumanth Joyce, oldest brother, ([phone])    Nicola Joyce, younger brother, POA        Review of Systems   Unable to perform ROS: mental status change         Medications:     Allergies:   Allergies   Allergen Reactions   • Valium [Diazepam] Unknown - Low Severity   • Xanax [Alprazolam] Unknown - Low Severity   • Morphine Anxiety       Current Meds:   Current Facility-Administered Medications:   •  acetaminophen (TYLENOL) tablet 650 mg, 650 mg, Oral, Q6H PRN **OR** acetaminophen (TYLENOL) suppository 650 mg, 650 mg, Rectal, Q4H PRN, Gerard Payne MD  •  Adult Standard Central TPN, , Intravenous, Q24H (TPN) **AND** Fat Emulsion Plant Based (INTRALIPID,LIPOSYN) 20 % infusion 50 g, 250 mL, Intravenous, Q24H (TPN), Gerard Payne MD  •  carvedilol (COREG) tablet 6.25 mg, 6.25 mg, Per G Tube, BID With Meals, Gerard Payne MD  •  clonazePAM (KlonoPIN) tablet 1 mg, 1 mg, Per G Tube, BID, Gerard Payne MD  •  dexmedetomidine HCl (PRECEDEX) 400 mcg in sodium chloride 0.9 % 100 mL (4 mcg/mL) infusion, 0.2-1.4 mcg/kg/hr, Intravenous, Titrated, Gerard Payne MD  •  dextrose (D50W) 25 g/ 50mL Intravenous Solution 25 g, 25 g, Intravenous, Q15 Min PRN, Gerard Payne MD  •  dextrose (GLUTOSE) oral gel 15 g, 15 g, Oral, Q15 Min PRN, Gerard Payne MD  •   glucagon (human recombinant) (GLUCAGEN DIAGNOSTIC) injection 1 mg, 1 mg, Subcutaneous, Q15 Min PRN, Gerard Payne MD  •  guaiFENesin (ROBITUSSIN) 100 MG/5ML oral solution 200 mg, 200 mg, Per G Tube, Q6H, Gerard Payne MD  •  heparin (porcine) 5000 UNIT/ML injection 5,000 Units, 5,000 Units, Subcutaneous, Q8H, Gerard Payne MD  •  hydrALAZINE (APRESOLINE) injection 20 mg, 20 mg, Intravenous, Q8H, Lani Weldon APRN  •  insulin lispro (humaLOG) injection 0-24 Units, 0-24 Units, Subcutaneous, Q6H, Lani Weldon APRN  •  ipratropium-albuterol (DUO-NEB) nebulizer solution 3 mL, 3 mL, Nebulization, Q4H - RT, Gerard Payne MD  •  labetalol (NORMODYNE,TRANDATE) injection 20 mg, 20 mg, Intravenous, Q4H PRN, Gerard Payne MD  •  LORazepam (ATIVAN) injection 1 mg, 1 mg, Intravenous, Q4H PRN, Gerard Payne MD  •  methylPREDNISolone sodium succinate (SOLU-Medrol) injection 80 mg, 80 mg, Intravenous, Q8H, Gerard Payne MD  •  ondansetron (ZOFRAN) injection 4 mg, 4 mg, Intravenous, Q6H PRN, Gerard Payne MD  •  oxybutynin (DITROPAN) tablet 5 mg, 5 mg, Per G Tube, TID, Gerard Payne MD  •  pantoprazole (PROTONIX) injection 40 mg, 40 mg, Intravenous, Daily, Gerard Payne MD  •  racemic epinephrine (RACEPINEPHRINE) nebulizer solution 0.5 mL, 0.5 mL, Nebulization, Q4H PRN, Marylu Marc APRN  •  risperiDONE (risperDAL) tablet 0.5 mg, 0.5 mg, Per G Tube, Nightly, Gerard Payne MD  •  saccharomyces boulardii (FLORASTOR) capsule 250 mg, 250 mg, Oral, BID, Gerard Payne MD  •  sodium chloride 0.9 % flush 10 mL, 10 mL, Intravenous, Q12H, Gerard Payne MD  •  sodium chloride 0.9 % flush 10 mL, 10 mL, Intravenous, Q12H, Gerard Payne MD  •  sodium chloride 0.9 % flush 10 mL, 10 mL, Intravenous, Q12H, Gerard Payne MD  •  sodium chloride 0.9 % flush 10 mL, 10 mL, Intravenous, PRN, Elmer,  "Gerard Robertson MD  •  sodium chloride 0.9 % flush 20 mL, 20 mL, Intravenous, PRN, Gerard Payne MD  •  sodium chloride nasal spray 1 spray, 1 spray, Each Nare, PRN, Gerard Payne MD  •  vancomycin 1500 mg/500 mL 0.9% NS IVPB ([facility]), 1,500 mg, Intravenous, Q12H, Gerard Payne MD  •  Laurie's amazing butt cream, , Topical, BID, Disha Longo, APRN  •  Laurie's amazing butt cream, , Topical, PRN, Disha Longo, APRN    Data:     Code Status:    There are no questions and answers to display.       No family history on file.    Social History     Socioeconomic History   • Marital status: Unknown     Spouse name: Not on file   • Number of children: Not on file   • Years of education: Not on file   • Highest education level: Not on file       Vitals:  Ht 157.5 cm (62.01\")   Wt 108 kg (238 lb 1.6 oz)   BMI 43.54 kg/m²   T 97.6 P 80 R 18 /83 Sp02 97% (cool aerosol 60%)      I/O (24Hr):  No intake or output data in the 24 hours ending 09/07/20 2005    Labs and imaging:      Recent Results (from the past 12 hour(s))   POC Glucose Once    Collection Time: 09/07/20 12:34 PM   Result Value Ref Range    Glucose 231 (H) 70 - 130 mg/dL   POC Glucose Once    Collection Time: 09/07/20  5:47 PM   Result Value Ref Range    Glucose 203 (H) 70 - 130 mg/dL         Physical Examination:        Physical Exam   Constitutional: She appears well-developed and well-nourished.   HENT:   Head: Normocephalic and atraumatic.   Nose: Nose normal.   Facial edema  MM dry   Eyes: Pupils are equal, round, and reactive to light. Conjunctivae and EOM are normal.   Neck: Normal range of motion. Neck supple.   Cardiovascular: Normal rate, regular rhythm, normal heart sounds and intact distal pulses.   Pulmonary/Chest: Effort normal and breath sounds normal.   Abdominal: Soft. Bowel sounds are normal.   obese   Musculoskeletal:   Generalized weakness  Left hemiplegia   Neurological:   Open eyes to verbal " command  No comprehensible speech   Skin: Skin is warm and dry.   Nursing note and vitals reviewed.        Assessment:            * No active hospital problems. *    Past Medical History:   Diagnosis Date   • Anxiety    • Asthma    • Brain injury (CMS/HCC)    • Bronchitis    • Dysphagia    • GERD (gastroesophageal reflux disease)    • Hypertension    • Obesity    • Pseudobulbar affect    • Rheumatoid arthritis (CMS/HCC)         Plan:        1. Acute hypoxic/hypercapneic respiratory failure  2. DM2 with hyperglycemia  3. Dysphagia  4. HTN  5. Late effects of previous CVA  6. GERD  7. Anxiety  8. RA  9. Morbid obesity  10. S/p aspiration event  11. S/p respiratory arrest  12. Pseudobulbar affect  13. Hypernatremia  14. MRSA sputum    Continue current treatment. Monitor counts. Increase activity as tolerated. Continue nutrition support via AMONs. Continue TPN for now - family does not wish to pursue feeding tube placement. Labs Tuesday. Maintain patient safety. Oxygen weaning as tolerated.  Receiving vancomycin for MRSA in the sputum.       Electronically signed by BEAU Pillai on 9/7/2020 at 20:05     I have discussed the care of Makenzie Norman, including pertinent history and exam findings, with the nurse practitioner.    I have seen and examined the patient and the key elements of all parts of the encounter have been performed by me.  I agree with the assessment, plan and orders as documented by BEAU Ploanco, after I modified the exam findings and the plan of treatments and the final version is my approved version of the assessment.        Electronically signed by Gerard Payne MD on 9/7/2020 at 21:28

## 2020-09-09 LAB
GLUCOSE BLDC GLUCOMTR-MCNC: 151 MG/DL (ref 70–130)
GLUCOSE BLDC GLUCOMTR-MCNC: 152 MG/DL (ref 70–130)
GLUCOSE BLDC GLUCOMTR-MCNC: 164 MG/DL (ref 70–130)
GLUCOSE BLDC GLUCOMTR-MCNC: 179 MG/DL (ref 70–130)
GLUCOSE BLDC GLUCOMTR-MCNC: 183 MG/DL (ref 70–130)

## 2020-09-09 PROCEDURE — 92526 ORAL FUNCTION THERAPY: CPT

## 2020-09-09 PROCEDURE — 63710000001 INSULIN DETEMIR PER 5 UNITS: Performed by: NURSE PRACTITIONER

## 2020-09-09 PROCEDURE — 25010000002 HEPARIN (PORCINE) PER 1000 UNITS: Performed by: INTERNAL MEDICINE

## 2020-09-09 PROCEDURE — 63710000001 INSULIN LISPRO (HUMAN) PER 5 UNITS: Performed by: NURSE PRACTITIONER

## 2020-09-09 PROCEDURE — 82962 GLUCOSE BLOOD TEST: CPT

## 2020-09-09 PROCEDURE — 25010000002 METHYLPREDNISOLONE PER 40 MG: Performed by: NURSE PRACTITIONER

## 2020-09-09 PROCEDURE — 97110 THERAPEUTIC EXERCISES: CPT

## 2020-09-09 PROCEDURE — 97530 THERAPEUTIC ACTIVITIES: CPT

## 2020-09-09 NOTE — PROGRESS NOTES
Elmer Payne M.D.  BEAU Corona        Internal Medicine Progress Note    9/9/2020   15:13    Name:  Makenzie Norman  MRN:    [id number]     Acct:     [id number]   Room:  [address] [place] [address] Day: 0     Admit Date: 9/1/2020  2:09 PM  PCP: Provider, No Known    Subjective:     C/C: need for continued oxygen weaning and nutrition support.     Interval History: Status:improved. Up to chair. No family at bedside. Alert and interacting with staff. Confused, but at baseline. Tolerating 02 at 2 lpm with sats in low 90s. Coarse lung sounds.  Afebrile. Abdomen distended. Continues with nutrition via TPN and meds per DHT.          Review of Systems   Unable to perform ROS: mental status change         Medications:     Allergies:   Allergies   Allergen Reactions   • Valium [Diazepam] Unknown - Low Severity   • Xanax [Alprazolam] Unknown - Low Severity   • Morphine Anxiety       Current Meds:   Current Facility-Administered Medications:   •  acetaminophen (TYLENOL) tablet 650 mg, 650 mg, Oral, Q6H PRN **OR** acetaminophen (TYLENOL) suppository 650 mg, 650 mg, Rectal, Q4H PRN, Gerard Payne MD  •  Adult Standard Central TPN, , Intravenous, Q24H (TPN) **AND** Fat Emulsion Plant Based (INTRALIPID,LIPOSYN) 20 % infusion 50 g, 250 mL, Intravenous, Q24H (TPN), Gerard Payne MD  •  carvedilol (COREG) tablet 6.25 mg, 6.25 mg, Per G Tube, BID With Meals, Gerard Payne MD  •  clonazePAM (KlonoPIN) tablet 1 mg, 1 mg, Per G Tube, BID, Gerard Payne MD  •  dextrose (D50W) 25 g/ 50mL Intravenous Solution 25 g, 25 g, Intravenous, Q15 Min PRN, Gerard Payne MD  •  dextrose (GLUTOSE) oral gel 15 g, 15 g, Oral, Q15 Min PRN, Gerard Payne MD  •  glucagon (human recombinant) (GLUCAGEN DIAGNOSTIC) injection 1 mg, 1 mg, Subcutaneous, Q15 Min PRN, Gerard Payne MD  •  guaiFENesin (ROBITUSSIN) 100 MG/5ML oral solution 200 mg, 200 mg, Per G Tube, Q6H, Gerard Payne  MD Marcelo  •  heparin (porcine) 5000 UNIT/ML injection 5,000 Units, 5,000 Units, Subcutaneous, Q8H, Gerard Payne MD  •  hydrALAZINE (APRESOLINE) tablet 25 mg, 25 mg, Per G Tube, Q8H, Lani Weldon APRN  •  insulin detemir (LEVEMIR) injection 5 Units, 5 Units, Subcutaneous, Nightly, Lani Weldon APRN  •  insulin lispro (humaLOG) injection 0-24 Units, 0-24 Units, Subcutaneous, Q6H, Lani Weldon APRN  •  ipratropium-albuterol (DUO-NEB) nebulizer solution 3 mL, 3 mL, Nebulization, Q4H - RT, Gerard Payne MD  •  labetalol (NORMODYNE,TRANDATE) injection 20 mg, 20 mg, Intravenous, Q4H PRN, Gerard Payne MD  •  lansoprazole (FIRST) oral suspension 30 mg, 30 mg, Per G Tube, Daily, Lani Weldon APRN  •  LORazepam (ATIVAN) injection 1 mg, 1 mg, Intravenous, Q4H PRN, Gerard Payne MD  •  methylPREDNISolone sodium succinate (SOLU-Medrol) injection 40 mg, 40 mg, Intravenous, Q12H, Lani Weldon APRN  •  ondansetron (ZOFRAN) injection 4 mg, 4 mg, Intravenous, Q6H PRN, Gerard Payne MD  •  oxybutynin (DITROPAN) tablet 5 mg, 5 mg, Per G Tube, TID, Gerard Pyane MD  •  racemic epinephrine (RACEPINEPHRINE) nebulizer solution 0.5 mL, 0.5 mL, Nebulization, Q4H PRN, Marylu Marc, APRN  •  risperiDONE (risperDAL) tablet 0.5 mg, 0.5 mg, Per G Tube, Nightly, Gerard Payne MD  •  saccharomyces boulardii (FLORASTOR) capsule 250 mg, 250 mg, Oral, BID, Gerard Payne MD  •  sodium chloride 0.9 % flush 10 mL, 10 mL, Intravenous, Q12H, Gerard Payne MD  •  sodium chloride 0.9 % flush 10 mL, 10 mL, Intravenous, Q12H, Gerard Payne MD  •  sodium chloride 0.9 % flush 10 mL, 10 mL, Intravenous, Q12H, Gerard Payne MD  •  sodium chloride 0.9 % flush 10 mL, 10 mL, Intravenous, PRN, Gerard Payne MD  •  sodium chloride 0.9 % flush 20 mL, 20 mL, Intravenous, PRN, Gerard Payne MD  •  sodium  "chloride nasal spray 1 spray, 1 spray, Each Nare, PRN, Gerard Payne MD  •  vancomycin 1 g/250 mL 0.9% NS (vial-mate), 1,000 mg, Intravenous, Q12H, Gerard Payne MD  •  Laurie's amazing butt cream, , Topical, BID, Disha Longo APRN  •  Laurie's amazing butt cream, , Topical, PRN, Disha Longo APRN    Data:     Code Status:    There are no questions and answers to display.       No family history on file.    Social History     Socioeconomic History   • Marital status: Unknown     Spouse name: Not on file   • Number of children: Not on file   • Years of education: Not on file   • Highest education level: Not on file       Vitals:  Ht 157.5 cm (62.01\")   Wt 108 kg (238 lb 1.6 oz)   BMI 43.54 kg/m²   T 98.2 P 126 R 17 /97 Sp02 92% 2 lpm)      I/O (24Hr):  No intake or output data in the 24 hours ending 09/09/20 1513    Labs and imaging:      Recent Results (from the past 12 hour(s))   POC Glucose Once    Collection Time: 09/09/20  5:05 AM   Result Value Ref Range    Glucose 164 (H) 70 - 130 mg/dL   POC Glucose Once    Collection Time: 09/09/20 12:42 PM   Result Value Ref Range    Glucose 183 (H) 70 - 130 mg/dL         Physical Examination:        Physical Exam   Constitutional: She appears well-developed and well-nourished.   HENT:   Head: Normocephalic and atraumatic.   Nose: Nose normal.   DHT to left nare   Eyes: Pupils are equal, round, and reactive to light. Conjunctivae and EOM are normal.   Neck: Normal range of motion. Neck supple.   Cardiovascular: Regular rhythm, normal heart sounds and intact distal pulses. Tachycardia present.   Pulmonary/Chest: Effort normal and breath sounds normal.   Abdominal: Soft. Bowel sounds are normal. She exhibits distension.   Obese   Musculoskeletal:   Generalized weakness  Left hemiplegia   Neurological: She is alert.   Interacting appropriately with staff  Confused   Skin: Skin is warm and dry.   Scattered bruising to " extremities   Psychiatric: She has a normal mood and affect. Her behavior is normal.   Nursing note and vitals reviewed.        Assessment:            * No active hospital problems. *    Past Medical History:   Diagnosis Date   • Anxiety    • Asthma    • Brain injury (CMS/HCC)    • Bronchitis    • Dysphagia    • GERD (gastroesophageal reflux disease)    • Hypertension    • Obesity    • Pseudobulbar affect    • Rheumatoid arthritis (CMS/HCC)         Plan:        1. Acute hypoxic/hypercapneic respiratory failure  2. DM2 with hyperglycemia  3. Dysphagia  4. HTN  5. Late effects of previous CVA  6. GERD  7. Anxiety  8. RA  9. Morbid obesity  10. S/p aspiration event  11. S/p respiratory arrest  12. Pseudobulbar affect  13. Hypernatremia  14. MRSA sputum    Continue current treatment. Monitor counts. Increase activity as tolerated. Continue nutrition support via AMONs. Continue TPN for now - family does not wish to pursue feeding tube placement. Labs in am. Maintain patient safety. Oxygen weaning as tolerated.  Steroid weaning.       Electronically signed by BEAU Sheppard on 9/9/2020 at 15:13     I have discussed the care of Makenzie Norman, including pertinent history and exam findings, with the nurse practitioner.    I have seen and examined the patient and the key elements of all parts of the encounter have been performed by me.  I agree with the assessment, plan and orders as documented by BEAU Corona, after I modified the exam findings and the plan of treatments and the final version is my approved version of the assessment.        Electronically signed by Gerard Payne MD on 9/9/2020 at 21:58

## 2020-09-09 NOTE — PROGRESS NOTES
"Pharmacy Dosing Service  Pharmacokinetics  Vancomycin Follow-up Evaluation    Assessment/Action/Plan:  Vancomycin trough = 14.9 (11.5 hours post dose). Continue current dose and frequency at this time. Pharmacy will continue to monitor renal function and adjust dose accordingly.    Predictions per Ward - Cole model  Regimen: 1000 mg every 12 hours for 5 doses.  Start time: 22:15 on 09/08/2020  Exposure target: AUC24 (range)400-600 mg/L.hr  AUC24,ss: 410 mg/L.hr  PAUC*: 65 %  Ctrough,ss: 14.2 mg/L  Pconc*: 0 %  Tox.: 9 %       Subjective:  Makenzie Norman is a 59 y.o. female currently on Vancomycin 1,000 mg IV every 12 hours for the treatment of Pneumonia, day 5 of therapy (Current vancomycin end date/final dose: 9/10/20 at 2215).    Objective:  Ht: 157.5 cm (62.01\"); Wt: 108 kg (238 lb 1.6 oz)  Estimated Creatinine Clearance: 179.6 mL/min (A) (by C-G formula based on SCr of 0.4 mg/dL (L)).   Lab Results   Component Value Date    CREATININE 0.40 (L) 09/08/2020    CREATININE 0.26 (L) 09/06/2020    CREATININE 0.32 (L) 09/03/2020    CREATININE 0.58 (L) 09/01/2020    CREATININE 0.63 08/31/2020    CREATININE 0.51 (L) 08/30/2020      Lab Results   Component Value Date    WBC 18.96 (H) 09/08/2020    WBC 17.89 (H) 09/03/2020    WBC 15.87 (H) 09/02/2020         Lab Results   Component Value Date    VANCOTROUGH 14.90 09/08/2020       Culture Results:  Microbiology Results (last 10 days)       Procedure Component Value - Date/Time    Fungus Culture - Sputum, Oropharynx [[id number]] Collected:  09/03/20 0942    Lab Status:  Preliminary result Specimen:  Sputum from Oropharynx Updated:  09/08/20 1000     Fungus Culture No fungus isolated at less than 1 week    Respiratory Culture - Sputum, Oropharynx [[id number]]  (Abnormal)  (Susceptibility) Collected:  09/03/20 0938    Lab Status:  Final result Specimen:  Sputum from Oropharynx Updated:  09/05/20 1059     Respiratory Culture Moderate growth (3+) Staphylococcus aureus, " MRSA     Comment:   Methicillin resistant Staphylococcus aureus, Patient may be an isolation risk.         Rare Normal Respiratory Diana     Gram Stain Greater than 25 WBCs per low power field      Rare (1+) Epithelial cells per low power field      Many (4+) Mixed gram positive diana    Susceptibility        Staphylococcus aureus, MRSA     NAVJOT     Clindamycin Resistant     Linezolid Susceptible     Oxacillin Resistant     Penicillin G Resistant     Tetracycline Resistant     Trimethoprim + Sulfamethoxazole Susceptible     Vancomycin Susceptible                              Armando Stephens, PharmD   09/08/20 22:23

## 2020-09-10 ENCOUNTER — APPOINTMENT (OUTPATIENT)
Dept: GENERAL RADIOLOGY | Facility: HOSPITAL | Age: 60
End: 2020-09-10

## 2020-09-10 LAB
ANION GAP SERPL CALCULATED.3IONS-SCNC: 8 MMOL/L (ref 5–15)
BASOPHILS # BLD AUTO: 0.05 10*3/MM3 (ref 0–0.2)
BASOPHILS NFR BLD AUTO: 0.2 % (ref 0–1.5)
BUN SERPL-MCNC: 21 MG/DL (ref 6–20)
BUN/CREAT SERPL: 60 (ref 7–25)
CALCIUM SPEC-SCNC: 8.8 MG/DL (ref 8.6–10.5)
CHLORIDE SERPL-SCNC: 104 MMOL/L (ref 98–107)
CO2 SERPL-SCNC: 31 MMOL/L (ref 22–29)
CREAT SERPL-MCNC: 0.35 MG/DL (ref 0.57–1)
DEPRECATED RDW RBC AUTO: 43.8 FL (ref 37–54)
EOSINOPHIL # BLD AUTO: 0 10*3/MM3 (ref 0–0.4)
EOSINOPHIL NFR BLD AUTO: 0 % (ref 0.3–6.2)
ERYTHROCYTE [DISTWIDTH] IN BLOOD BY AUTOMATED COUNT: 13.5 % (ref 12.3–15.4)
GFR SERPL CREATININE-BSD FRML MDRD: >150 ML/MIN/1.73
GLUCOSE BLDC GLUCOMTR-MCNC: 140 MG/DL (ref 70–130)
GLUCOSE BLDC GLUCOMTR-MCNC: 156 MG/DL (ref 70–130)
GLUCOSE BLDC GLUCOMTR-MCNC: 178 MG/DL (ref 70–130)
GLUCOSE SERPL-MCNC: 185 MG/DL (ref 65–99)
HCT VFR BLD AUTO: 38.9 % (ref 34–46.6)
HGB BLD-MCNC: 12.8 G/DL (ref 12–15.9)
IMM GRANULOCYTES # BLD AUTO: 0.38 10*3/MM3 (ref 0–0.05)
IMM GRANULOCYTES NFR BLD AUTO: 1.9 % (ref 0–0.5)
LYMPHOCYTES # BLD AUTO: 0.55 10*3/MM3 (ref 0.7–3.1)
LYMPHOCYTES NFR BLD AUTO: 2.7 % (ref 19.6–45.3)
MAGNESIUM SERPL-MCNC: 2 MG/DL (ref 1.6–2.6)
MCH RBC QN AUTO: 29.2 PG (ref 26.6–33)
MCHC RBC AUTO-ENTMCNC: 32.9 G/DL (ref 31.5–35.7)
MCV RBC AUTO: 88.6 FL (ref 79–97)
MONOCYTES # BLD AUTO: 1.68 10*3/MM3 (ref 0.1–0.9)
MONOCYTES NFR BLD AUTO: 8.2 % (ref 5–12)
NEUTROPHILS NFR BLD AUTO: 17.77 10*3/MM3 (ref 1.7–7)
NEUTROPHILS NFR BLD AUTO: 87 % (ref 42.7–76)
NRBC BLD AUTO-RTO: 0 /100 WBC (ref 0–0.2)
PHOSPHATE SERPL-MCNC: 3.7 MG/DL (ref 2.5–4.5)
PLATELET # BLD AUTO: 181 10*3/MM3 (ref 140–450)
PMV BLD AUTO: 12.4 FL (ref 6–12)
POTASSIUM SERPL-SCNC: 4.1 MMOL/L (ref 3.5–5.2)
RBC # BLD AUTO: 4.39 10*6/MM3 (ref 3.77–5.28)
SODIUM SERPL-SCNC: 143 MMOL/L (ref 136–145)
WBC # BLD AUTO: 20.43 10*3/MM3 (ref 3.4–10.8)

## 2020-09-10 PROCEDURE — 97110 THERAPEUTIC EXERCISES: CPT

## 2020-09-10 PROCEDURE — 80048 BASIC METABOLIC PNL TOTAL CA: CPT | Performed by: INTERNAL MEDICINE

## 2020-09-10 PROCEDURE — 82962 GLUCOSE BLOOD TEST: CPT

## 2020-09-10 PROCEDURE — 92526 ORAL FUNCTION THERAPY: CPT | Performed by: SPEECH-LANGUAGE PATHOLOGIST

## 2020-09-10 PROCEDURE — 63710000001 INSULIN LISPRO (HUMAN) PER 5 UNITS: Performed by: NURSE PRACTITIONER

## 2020-09-10 PROCEDURE — 25010000002 METHYLPREDNISOLONE PER 40 MG: Performed by: NURSE PRACTITIONER

## 2020-09-10 PROCEDURE — 25010000002 HEPARIN (PORCINE) PER 1000 UNITS: Performed by: INTERNAL MEDICINE

## 2020-09-10 PROCEDURE — 85025 COMPLETE CBC W/AUTO DIFF WBC: CPT | Performed by: INTERNAL MEDICINE

## 2020-09-10 PROCEDURE — 83735 ASSAY OF MAGNESIUM: CPT | Performed by: INTERNAL MEDICINE

## 2020-09-10 PROCEDURE — 74018 RADEX ABDOMEN 1 VIEW: CPT

## 2020-09-10 PROCEDURE — 84100 ASSAY OF PHOSPHORUS: CPT | Performed by: INTERNAL MEDICINE

## 2020-09-10 PROCEDURE — 63710000001 INSULIN DETEMIR PER 5 UNITS: Performed by: NURSE PRACTITIONER

## 2020-09-10 RX ORDER — DOCUSATE SODIUM 100 MG/1
100 CAPSULE, LIQUID FILLED ORAL 2 TIMES DAILY
Status: DISCONTINUED | OUTPATIENT
Start: 2020-09-10 | End: 2020-09-21

## 2020-09-10 RX ORDER — POLYETHYLENE GLYCOL 3350 17 G/17G
17 POWDER, FOR SOLUTION ORAL DAILY
Status: DISCONTINUED | OUTPATIENT
Start: 2020-09-10 | End: 2020-09-21

## 2020-09-10 NOTE — PROGRESS NOTES
Elmer Payne M.D.  BEAU Corona        Internal Medicine Progress Note    9/10/2020   09:24    Name:  Makenzie Norman  MRN:    [id number]     Acct:     [id number]   Room:  [address] [place] [address] Day: 0     Admit Date: 9/1/2020  2:09 PM  PCP: Provider, No Known    Subjective:     C/C: need for continued oxygen weaning and nutrition support.     Interval History: Status:improved. Resting in bed.  No family at bedside. Alert and interacting with staff. Confused, but at baseline. Tolerating 02 at 2 lpm with sats in low 90s. Coarse lung sounds.  Afebrile. Abdomen distended. Continues with nutrition via TPN and meds per DHT. Wants a pizza with everything. No BM for several days.          Review of Systems   Unable to perform ROS: mental status change         Medications:     Allergies:   Allergies   Allergen Reactions   • Valium [Diazepam] Unknown - Low Severity   • Xanax [Alprazolam] Unknown - Low Severity   • Morphine Anxiety       Current Meds:   Current Facility-Administered Medications:   •  acetaminophen (TYLENOL) tablet 650 mg, 650 mg, Oral, Q6H PRN **OR** acetaminophen (TYLENOL) suppository 650 mg, 650 mg, Rectal, Q4H PRN, Gerard Payne MD  •  Adult Standard Central TPN, , Intravenous, Q24H (TPN) **AND** Fat Emulsion Plant Based (INTRALIPID,LIPOSYN) 20 % infusion 50 g, 250 mL, Intravenous, Q24H (TPN), Gerard Payne MD  •  carvedilol (COREG) tablet 6.25 mg, 6.25 mg, Per G Tube, BID With Meals, Gerard Payne MD  •  clonazePAM (KlonoPIN) tablet 1 mg, 1 mg, Per G Tube, BID, Gerard Payne MD  •  dextrose (D50W) 25 g/ 50mL Intravenous Solution 25 g, 25 g, Intravenous, Q15 Min PRN, Gerard Payne MD  •  dextrose (GLUTOSE) oral gel 15 g, 15 g, Oral, Q15 Min PRN, Gerard Payne MD  •  glucagon (human recombinant) (GLUCAGEN DIAGNOSTIC) injection 1 mg, 1 mg, Subcutaneous, Q15 Min PRN, Gerard Pyane MD  •  guaiFENesin (ROBITUSSIN) 100 MG/5ML oral  solution 200 mg, 200 mg, Per G Tube, Q6H, Gerard Payne MD  •  heparin (porcine) 5000 UNIT/ML injection 5,000 Units, 5,000 Units, Subcutaneous, Q8H, Gerard Payne MD  •  hydrALAZINE (APRESOLINE) tablet 25 mg, 25 mg, Per G Tube, Q8H, Lani Weldon APRN  •  insulin detemir (LEVEMIR) injection 5 Units, 5 Units, Subcutaneous, Nightly, Lani Weldon APRN  •  insulin lispro (humaLOG) injection 0-24 Units, 0-24 Units, Subcutaneous, Q6H, Lani Weldon APRN  •  ipratropium-albuterol (DUO-NEB) nebulizer solution 3 mL, 3 mL, Nebulization, Q4H - RT, Gerard Payne MD  •  labetalol (NORMODYNE,TRANDATE) injection 20 mg, 20 mg, Intravenous, Q4H PRN, Gerard Payne MD  •  lansoprazole (FIRST) oral suspension 30 mg, 30 mg, Per G Tube, Daily, Lani Weldon APRN  •  LORazepam (ATIVAN) injection 1 mg, 1 mg, Intravenous, Q4H PRN, Gerard Payne MD  •  methylPREDNISolone sodium succinate (SOLU-Medrol) injection 40 mg, 40 mg, Intravenous, Q12H, Lani Weldon APRN  •  ondansetron (ZOFRAN) injection 4 mg, 4 mg, Intravenous, Q6H PRN, Gerard Payne MD  •  oxybutynin (DITROPAN) tablet 5 mg, 5 mg, Per G Tube, TID, Gerard Payne MD  •  racemic epinephrine (RACEPINEPHRINE) nebulizer solution 0.5 mL, 0.5 mL, Nebulization, Q4H PRN, Marylu Marc, APRN  •  risperiDONE (risperDAL) tablet 0.5 mg, 0.5 mg, Per G Tube, Nightly, Gerard Payne MD  •  saccharomyces boulardii (FLORASTOR) capsule 250 mg, 250 mg, Oral, BID, Gerard Payne MD  •  sodium chloride 0.9 % flush 10 mL, 10 mL, Intravenous, Q12H, Gerard Payne MD  •  sodium chloride 0.9 % flush 10 mL, 10 mL, Intravenous, Q12H, Gerard Payne MD  •  sodium chloride 0.9 % flush 10 mL, 10 mL, Intravenous, Q12H, Gerard Payne MD  •  sodium chloride 0.9 % flush 10 mL, 10 mL, Intravenous, PRN, Gerard Payne MD  •  sodium chloride 0.9 % flush 20 mL, 20 mL,  "Intravenous, PRN, Gerard Payne MD  •  sodium chloride nasal spray 1 spray, 1 spray, Each Nare, PRN, Gerard Payne MD  •  vancomycin 1 g/250 mL 0.9% NS (vial-mate), 1,000 mg, Intravenous, Q12H, Gerard Payne MD  •  Laurie's amazing butt cream, , Topical, BID, Disha Longo APRN  •  Laurie's amazing butt cream, , Topical, PRN, Disha Longo, APRN    Data:     Code Status:    There are no questions and answers to display.       No family history on file.    Social History     Socioeconomic History   • Marital status: Unknown     Spouse name: Not on file   • Number of children: Not on file   • Years of education: Not on file   • Highest education level: Not on file       Vitals:  Ht 157.5 cm (62.01\")   Wt 108 kg (238 lb 1.6 oz)   BMI 43.54 kg/m²   T 97.9 P 87 R 12 /73 Sp02 92% (2 lpm)      I/O (24Hr):  No intake or output data in the 24 hours ending 09/10/20 0924    Labs and imaging:      Recent Results (from the past 12 hour(s))   POC Glucose Once    Collection Time: 09/09/20 11:41 PM   Result Value Ref Range    Glucose 151 (H) 70 - 130 mg/dL   POC Glucose Once    Collection Time: 09/10/20  5:37 AM   Result Value Ref Range    Glucose 178 (H) 70 - 130 mg/dL         Physical Examination:        Physical Exam   Constitutional: She appears well-developed and well-nourished.   HENT:   Head: Normocephalic and atraumatic.   Nose: Nose normal.   DHT to left nare  Flushed   Eyes: Pupils are equal, round, and reactive to light. Conjunctivae and EOM are normal.   Neck: Normal range of motion. Neck supple.   Cardiovascular: Normal rate, regular rhythm, normal heart sounds and intact distal pulses.   Pulmonary/Chest: Effort normal and breath sounds normal.   Abdominal: Soft. Bowel sounds are normal. She exhibits distension.   Obese   Musculoskeletal:   Generalized weakness  Left hemiplegia   Neurological: She is alert.   Interacting appropriately with staff  Oriented to " "person (\"[facility]\", \"2021\")   Skin: Skin is warm and dry.   Scattered bruising to extremities   Psychiatric: She has a normal mood and affect. Her behavior is normal.   Nursing note and vitals reviewed.        Assessment:            * No active hospital problems. *    Past Medical History:   Diagnosis Date   • Anxiety    • Asthma    • Brain injury (CMS/HCC)    • Bronchitis    • Dysphagia    • GERD (gastroesophageal reflux disease)    • Hypertension    • Obesity    • Pseudobulbar affect    • Rheumatoid arthritis (CMS/HCC)         Plan:        1. Acute hypoxic/hypercapneic respiratory failure  2. DM2 with hyperglycemia  3. Dysphagia  4. HTN  5. Late effects of previous CVA  6. GERD  7. Anxiety  8. RA  9. Morbid obesity  10. S/p aspiration event  11. S/p respiratory arrest  12. Pseudobulbar affect  13. Hypernatremia  14. MRSA sputum    Continue current treatment. Monitor counts. Increase activity as tolerated. Continue nutrition support via AMONs. Continue TPN for now - family does not wish to pursue feeding tube placement. Labs Monday. Maintain patient safety. Oxygen weaning as tolerated.  Steroid weaning. SLP eval and treat. Add laxatives/stool softeners. KUB. Watch off antibiotics.       Electronically signed by BEAU Sheppard on 9/10/2020 at 09:24     I have discussed the care of Makenzie Norman, including pertinent history and exam findings, with the nurse practitioner.    I have seen and examined the patient and the key elements of all parts of the encounter have been performed by me.  I agree with the assessment, plan and orders as documented by BEAU Corona, after I modified the exam findings and the plan of treatments and the final version is my approved version of the assessment.        Electronically signed by Gerard Payne MD on 9/10/2020 at 13:25    "

## 2020-09-10 NOTE — PROGRESS NOTES
Adult Nutrition  Assessment/PES    Patient Name:  Makenzie Norman  YOB: 1960  MRN: [id number]  Admit Date:  9/1/2020    Assessment Date:  9/10/2020    Comments:  Pt remains on TPN/Lipids. She is much more alert and talking now. Last BM noted as 8/31 -- MD aware. Pertinent labs reviewed, no adjustment made to TPN. She has DHT now for meds only. Will continue to follow for nutrition needs.     Reason for Assessment     Row Name 09/10/20 1422          Reason for Assessment    Reason For Assessment  follow-up protocol         Nutrition/Diet History     Row Name 09/10/20 1422          Nutrition/Diet History    Typical Food/Fluid Intake  Pt remains on TPN/Lipids at this time. She is much more alert and talking at time of RD visit. She now has DHT for meds only.      Factors Affecting Nutritional Intake  difficulty/impaired swallowing         Anthropometrics     Row Name 09/10/20 1423          Usual Body Weight (UBW)    Weight Loss Time Frame  current wt 238#        Body Mass Index (BMI)    BMI Assessment  BMI 40 or greater: obesity grade III         Labs/Tests/Procedures/Meds     Row Name 09/10/20 1423          Labs/Procedures/Meds    Lab Results Reviewed  reviewed, pertinent     Lab Results Comments  glucose; BUN; BUN/Cr; wbc; crp; triglycerides; Cr        Medications    Pertinent Medications Reviewed  reviewed, pertinent     Pertinent Medications Comments  TPN/Lipids; klonopin; colace; heparin; insulin; steroid; miralax; florastor; vanc         Physical Findings     Row Name 09/10/20 1424          Physical Findings    Overall Physical Appearance  obese;on oxygen therapy Weakness     Gastrointestinal  other (see comments) Last BM 8/31 -- also noted as unknown??     Skin  other (see comments);edema Wounds noted. christian 14/9           Nutrition Prescription Ordered     Row Name 09/10/20 1425          Nutrition Prescription PO    Current PO Diet  NPO        Nutrition Prescription PN    PN Route  Central      PN Goal Rate (mL/hr)  50 mL/hr     PN Goal Volume (mL)  1200 mL     Dextrose Concentration (%)  15 %     Dextrose (Kcal)  612     Amino Acid Concentration (%)  5 %     Amino Acid (gm)  60     Lipid Concentration (%)  20%     Lipid Volume (mL)  250 mL     Lipid Frequency  Daily         Evaluation of Received Nutrient/Fluid Intake     Row Name 09/10/20 1425          Nutrient/Fluid Evaluation    Number of Days Evaluated  3 days     Additional Documentation  Fluid Intake Evaluation (Group)        Fluid Intake Evaluation    Free Water Flush Fluid (mL)  120     IV Fluid (mL)  2774        PN Evaluation    Number of Days PN Evaluated  Insufficient Data no differentiation of IV fluids and TPN/Lipids               Problem/Interventions:  Problem 1     Row Name 09/10/20 1426          Nutrition Diagnoses Problem 1    Problem 1  Needs Alternate Route     Etiology (related to)  Medical Diagnosis     Neurological  Other (comment);TBI;CVA h/o TBI; CVA     Pulmonary/Critical Care  Acute respiratory failure;Hypercapnea;Hypoxemia;Pneumonia     Signs/Symptoms (evidenced by)  No EN Route Available;Other (comment) central line for TPN/lipids. Family doesn't wish to pursue feeding tube placement.                Intervention Goal     Row Name 09/10/20 1426          Intervention Goal    General  Nutrition support treatment;Reduce/improve symptoms;Meet nutritional needs for age/condition;Disease management/therapy     TF/PN  Maintain TF/PN     Weight  No significant weight loss         Nutrition Intervention     Row Name 09/10/20 1426          Nutrition Intervention    RD/Tech Action  Follow Tx progress;Care plan reviewd           Education/Evaluation     Row Name 09/10/20 1426          Education    Education  No discharge needs identified at this time        Monitor/Evaluation    Monitor  Per protocol;PN delivery/tolerance;Pertinent labs           Electronically signed by:  Chitra Davis  09/10/20 14:27

## 2020-09-11 LAB
GLUCOSE BLDC GLUCOMTR-MCNC: 160 MG/DL (ref 70–130)
GLUCOSE BLDC GLUCOMTR-MCNC: 163 MG/DL (ref 70–130)
GLUCOSE BLDC GLUCOMTR-MCNC: 165 MG/DL (ref 70–130)
GLUCOSE BLDC GLUCOMTR-MCNC: 172 MG/DL (ref 70–130)
GLUCOSE BLDC GLUCOMTR-MCNC: 197 MG/DL (ref 70–130)

## 2020-09-11 PROCEDURE — 97110 THERAPEUTIC EXERCISES: CPT

## 2020-09-11 PROCEDURE — 63710000001 INSULIN LISPRO (HUMAN) PER 5 UNITS: Performed by: NURSE PRACTITIONER

## 2020-09-11 PROCEDURE — 25010000002 METHYLPREDNISOLONE PER 40 MG: Performed by: NURSE PRACTITIONER

## 2020-09-11 PROCEDURE — 63710000001 INSULIN DETEMIR PER 5 UNITS: Performed by: NURSE PRACTITIONER

## 2020-09-11 PROCEDURE — 92526 ORAL FUNCTION THERAPY: CPT

## 2020-09-11 PROCEDURE — 25010000002 HEPARIN (PORCINE) PER 1000 UNITS: Performed by: INTERNAL MEDICINE

## 2020-09-11 PROCEDURE — 97535 SELF CARE MNGMENT TRAINING: CPT

## 2020-09-11 PROCEDURE — 82962 GLUCOSE BLOOD TEST: CPT

## 2020-09-11 RX ORDER — LACTULOSE 20 G/30ML
20 SOLUTION ORAL DAILY PRN
Status: DISCONTINUED | OUTPATIENT
Start: 2020-09-12 | End: 2020-09-22 | Stop reason: HOSPADM

## 2020-09-11 RX ORDER — LACTULOSE 20 G/30ML
20 SOLUTION ORAL DAILY PRN
Status: DISCONTINUED | OUTPATIENT
Start: 2020-09-12 | End: 2020-09-11

## 2020-09-11 RX ORDER — LACTULOSE 20 G/30ML
20 SOLUTION ORAL ONCE
Status: DISCONTINUED | OUTPATIENT
Start: 2020-09-11 | End: 2020-09-21

## 2020-09-11 NOTE — PROGRESS NOTES
Elmer Payne M.D.  BEAU Cornoa        Internal Medicine Progress Note    9/11/2020   13:03    Name:  Makenzie Norman  MRN:    [id number]     Acct:     [id number]   Room:  [address] [place] [address] Day: 0     Admit Date: 9/1/2020  2:09 PM  PCP: Provider, No Known    Subjective:     C/C: need for continued oxygen weaning and nutrition support.     Interval History: Status:improved. Resting in bed.  No family at bedside. Alert and interacting with staff. Confused, but at baseline. Tolerating 02 at 2 lpm with sats in low 90s. Afebrile. Abdomen distended. Continues with nutrition via TPN and meds per DHT. No BM for several days.          Review of Systems   Unable to perform ROS: mental status change         Medications:     Allergies:   Allergies   Allergen Reactions   • Valium [Diazepam] Unknown - Low Severity   • Xanax [Alprazolam] Unknown - Low Severity   • Morphine Anxiety       Current Meds:   Current Facility-Administered Medications:   •  acetaminophen (TYLENOL) tablet 650 mg, 650 mg, Oral, Q6H PRN **OR** acetaminophen (TYLENOL) suppository 650 mg, 650 mg, Rectal, Q4H PRN, Gerard Payne MD  •  Adult Standard Central TPN, , Intravenous, Q24H (TPN) **AND** Fat Emulsion Plant Based (INTRALIPID,LIPOSYN) 20 % infusion 50 g, 250 mL, Intravenous, Q24H (TPN), Gerard Payne MD  •  carvedilol (COREG) tablet 6.25 mg, 6.25 mg, Per G Tube, BID With Meals, Gerard Payne MD  •  dextrose (D50W) 25 g/ 50mL Intravenous Solution 25 g, 25 g, Intravenous, Q15 Min PRN, Gerard Payne MD  •  dextrose (GLUTOSE) oral gel 15 g, 15 g, Oral, Q15 Min PRN, Gerard Payne MD  •  docusate sodium (COLACE) capsule 100 mg, 100 mg, Per G Tube, BID, Gerard Payne MD  •  glucagon (human recombinant) (GLUCAGEN DIAGNOSTIC) injection 1 mg, 1 mg, Subcutaneous, Q15 Min PRN, Gerard Payne MD  •  guaiFENesin (ROBITUSSIN) 100 MG/5ML oral solution 200 mg, 200 mg, Per G Tube, Q6H,  Gerard Payne MD  •  heparin (porcine) 5000 UNIT/ML injection 5,000 Units, 5,000 Units, Subcutaneous, Q8H, Gerard Payne MD  •  hydrALAZINE (APRESOLINE) tablet 25 mg, 25 mg, Per G Tube, Q8H, Lani Weldon APRN  •  insulin detemir (LEVEMIR) injection 5 Units, 5 Units, Subcutaneous, Nightly, Lani Weldon APRN  •  insulin lispro (humaLOG) injection 0-24 Units, 0-24 Units, Subcutaneous, Q6H, Lani Weldon APRN  •  ipratropium-albuterol (DUO-NEB) nebulizer solution 3 mL, 3 mL, Nebulization, Q4H - RT, Gerard Payne MD  •  labetalol (NORMODYNE,TRANDATE) injection 20 mg, 20 mg, Intravenous, Q4H PRN, Gerard Payne MD  •  lansoprazole (FIRST) oral suspension 30 mg, 30 mg, Per G Tube, Daily, Lani Weldon APRN  •  LORazepam (ATIVAN) injection 1 mg, 1 mg, Intravenous, Q4H PRN, Gerard Payne MD  •  methylPREDNISolone sodium succinate (SOLU-Medrol) injection 40 mg, 40 mg, Intravenous, Q12H, Lani Weldon, BEAU  •  ondansetron (ZOFRAN) injection 4 mg, 4 mg, Intravenous, Q6H PRN, Gerard Payne MD  •  oxybutynin (DITROPAN) tablet 5 mg, 5 mg, Per G Tube, TID, Gerard Payne MD  •  polyethylene glycol (MIRALAX) packet 17 g, 17 g, Per G Tube, Daily, Gerard Payne MD  •  racemic epinephrine (RACEPINEPHRINE) nebulizer solution 0.5 mL, 0.5 mL, Nebulization, Q4H PRN, Marylu Marc, APRN  •  risperiDONE (risperDAL) tablet 0.5 mg, 0.5 mg, Per G Tube, Nightly, Gerard Payne MD  •  saccharomyces boulardii (FLORASTOR) capsule 250 mg, 250 mg, Oral, BID, Gerard Payne MD  •  sodium chloride 0.9 % flush 10 mL, 10 mL, Intravenous, Q12H, Gerard Payne MD  •  sodium chloride 0.9 % flush 10 mL, 10 mL, Intravenous, Q12H, Gerard Payne MD  •  sodium chloride 0.9 % flush 10 mL, 10 mL, Intravenous, Q12H, Gerard Payne MD  •  sodium chloride 0.9 % flush 10 mL, 10 mL, Intravenous, PRN, Gerard Payne  "MD Marcelo  •  sodium chloride 0.9 % flush 20 mL, 20 mL, Intravenous, PRN, Geradr Payne MD  •  sodium chloride nasal spray 1 spray, 1 spray, Each Nare, PRN, Gerard Payne MD  •  Laurie's amazing butt cream, , Topical, BID, Donta, Disha Mcconnell, APRN  •  Laurie's amazing butt cream, , Topical, PRN, Disha Longo, APRN    Data:     Code Status:    There are no questions and answers to display.       No family history on file.    Social History     Socioeconomic History   • Marital status: Unknown     Spouse name: Not on file   • Number of children: Not on file   • Years of education: Not on file   • Highest education level: Not on file       Vitals:  Ht 157.5 cm (62.01\")   Wt 108 kg (238 lb 1.6 oz)   BMI 43.54 kg/m²   T 99.8 P 105 R 16 /74 Sp02 91% (2 lpm)      I/O (24Hr):  No intake or output data in the 24 hours ending 09/11/20 1303    Labs and imaging:      Recent Results (from the past 12 hour(s))   POC Glucose Once    Collection Time: 09/11/20  5:30 AM   Result Value Ref Range    Glucose 197 (H) 70 - 130 mg/dL   POC Glucose Once    Collection Time: 09/11/20 12:10 PM   Result Value Ref Range    Glucose 163 (H) 70 - 130 mg/dL         Physical Examination:        Physical Exam   Constitutional: She appears well-developed and well-nourished.   HENT:   Head: Normocephalic and atraumatic.   Nose: Nose normal.   DHT to left nare  Flushed   Eyes: Pupils are equal, round, and reactive to light. Conjunctivae and EOM are normal.   Neck: Normal range of motion. Neck supple.   Cardiovascular: Normal rate, regular rhythm, normal heart sounds and intact distal pulses.   Pulmonary/Chest: Effort normal and breath sounds normal.   Abdominal: Soft. Bowel sounds are normal. She exhibits distension.   Obese   Musculoskeletal: She exhibits edema (LUE).   Generalized weakness  Left hemiplegia   Neurological: She is alert.   Interacting appropriately with staff  Oriented to person (\"[facility] \"2021\")   Skin: Skin is warm and dry.   Scattered bruising to extremities   Psychiatric: She has a normal mood and affect. Her behavior is normal.   Nursing note and vitals reviewed.        Assessment:            * No active hospital problems. *    Past Medical History:   Diagnosis Date   • Anxiety    • Asthma    • Brain injury (CMS/HCC)    • Bronchitis    • Dysphagia    • GERD (gastroesophageal reflux disease)    • Hypertension    • Obesity    • Pseudobulbar affect    • Rheumatoid arthritis (CMS/HCC)         Plan:        1. Acute hypoxic/hypercapneic respiratory failure  2. DM2 with hyperglycemia  3. Dysphagia  4. HTN  5. Late effects of previous CVA  6. GERD  7. Anxiety  8. RA  9. Morbid obesity  10. S/p aspiration event  11. S/p respiratory arrest  12. Pseudobulbar affect  13. Hypernatremia  14. MRSA sputum    Continue current treatment. Monitor counts. Increase activity as tolerated. Continue nutrition support via AMONs. Continue TPN for now - family does not wish to pursue feeding tube placement. Labs Monday. Maintain patient safety. Oxygen weaning as tolerated.  Steroid weaning. SLP eval and treat  Continue bowel regimen.  Watch off antibiotics.       Electronically signed by BEAU Sheppard on 9/11/2020 at 13:03       "

## 2020-09-12 ENCOUNTER — APPOINTMENT (OUTPATIENT)
Dept: GENERAL RADIOLOGY | Facility: HOSPITAL | Age: 60
End: 2020-09-12

## 2020-09-12 LAB
GLUCOSE BLDC GLUCOMTR-MCNC: 146 MG/DL (ref 70–130)
GLUCOSE BLDC GLUCOMTR-MCNC: 167 MG/DL (ref 70–130)
GLUCOSE BLDC GLUCOMTR-MCNC: 168 MG/DL (ref 70–130)
GLUCOSE BLDC GLUCOMTR-MCNC: 170 MG/DL (ref 70–130)
GLUCOSE BLDC GLUCOMTR-MCNC: 180 MG/DL (ref 70–130)

## 2020-09-12 PROCEDURE — 25010000002 FUROSEMIDE PER 20 MG: Performed by: INTERNAL MEDICINE

## 2020-09-12 PROCEDURE — 71045 X-RAY EXAM CHEST 1 VIEW: CPT

## 2020-09-12 PROCEDURE — 25010000002 HEPARIN (PORCINE) PER 1000 UNITS: Performed by: INTERNAL MEDICINE

## 2020-09-12 PROCEDURE — 63710000001 INSULIN LISPRO (HUMAN) PER 5 UNITS: Performed by: NURSE PRACTITIONER

## 2020-09-12 PROCEDURE — 25010000002 LORAZEPAM PER 2 MG: Performed by: INTERNAL MEDICINE

## 2020-09-12 PROCEDURE — 25010000002 METHYLPREDNISOLONE PER 40 MG: Performed by: NURSE PRACTITIONER

## 2020-09-12 PROCEDURE — 74018 RADEX ABDOMEN 1 VIEW: CPT

## 2020-09-12 PROCEDURE — 82962 GLUCOSE BLOOD TEST: CPT

## 2020-09-12 PROCEDURE — 63710000001 INSULIN DETEMIR PER 5 UNITS: Performed by: NURSE PRACTITIONER

## 2020-09-12 RX ORDER — FUROSEMIDE 10 MG/ML
40 INJECTION INTRAMUSCULAR; INTRAVENOUS ONCE
Status: DISCONTINUED | OUTPATIENT
Start: 2020-09-12 | End: 2020-09-16

## 2020-09-12 NOTE — PROGRESS NOTES
Elmer Payne M.D.  [facility] [place]        Internal Medicine Progress Note    9/12/2020   05:31 CDT    Name:  Makenzie Norman  MRN:    [id number]     Acct:     [id number]   Room:  [address] [place] [address] Day: 0     Admit Date: 9/1/2020  2:09 PM  PCP: Provider, No Known    Subjective:     C/C: need for continued oxygen weaning and nutrition support.     Interval History: Status:improved. Resting in bed.  No family at bedside. Alert and interacting with staff. Confused, but at baseline. Tolerating 02 at 2 lpm with sats in low 90s. Abdomen distended. Continues with nutrition via TPN and meds per DHT. No new concerns.          Review of Systems   Unable to perform ROS: mental status change         Medications:     Allergies:   Allergies   Allergen Reactions   • Valium [Diazepam] Unknown - Low Severity   • Xanax [Alprazolam] Unknown - Low Severity   • Morphine Anxiety       Current Meds:   Current Facility-Administered Medications:   •  acetaminophen (TYLENOL) tablet 650 mg, 650 mg, Oral, Q6H PRN **OR** acetaminophen (TYLENOL) suppository 650 mg, 650 mg, Rectal, Q4H PRN, Gerard Payne MD  •  Adult Standard Central TPN, , Intravenous, Q24H (TPN) **AND** Fat Emulsion Plant Based (INTRALIPID,LIPOSYN) 20 % infusion 50 g, 250 mL, Intravenous, Q24H (TPN), Gerard Payne MD  •  carvedilol (COREG) tablet 6.25 mg, 6.25 mg, Per G Tube, BID With Meals, Gerard Payne MD  •  dextrose (D50W) 25 g/ 50mL Intravenous Solution 25 g, 25 g, Intravenous, Q15 Min PRN, Gerard Payne MD  •  dextrose (GLUTOSE) oral gel 15 g, 15 g, Oral, Q15 Min PRN, Gerard Payne MD  •  docusate sodium (COLACE) capsule 100 mg, 100 mg, Per G Tube, BID, Gerard Payne MD  •  glucagon (human recombinant) (GLUCAGEN DIAGNOSTIC) injection 1 mg, 1 mg, Subcutaneous, Q15 Min PRN, Gerard Payne MD  •  guaiFENesin (ROBITUSSIN) 100 MG/5ML oral solution 200 mg, 200 mg, Per G Tube, Q6H, Gerard Payne  MD Marcelo  •  heparin (porcine) 5000 UNIT/ML injection 5,000 Units, 5,000 Units, Subcutaneous, Q8H, Gerard Payne MD  •  hydrALAZINE (APRESOLINE) tablet 25 mg, 25 mg, Per G Tube, Q8H, Lani Weldon APRN  •  insulin detemir (LEVEMIR) injection 5 Units, 5 Units, Subcutaneous, Nightly, Lani Weldon APRN  •  insulin lispro (humaLOG) injection 0-24 Units, 0-24 Units, Subcutaneous, Q6H, Lani Weldon APRN  •  ipratropium-albuterol (DUO-NEB) nebulizer solution 3 mL, 3 mL, Nebulization, Q4H - RT, Gerard Payne MD  •  labetalol (NORMODYNE,TRANDATE) injection 20 mg, 20 mg, Intravenous, Q4H PRN, Gerard Payne MD  •  lactulose solution 20 g, 20 g, Per G Tube, Once, Lani Weldon APRN  •  lactulose solution 20 g, 20 g, Per G Tube, Daily PRN, Lani Weldon APRN  •  lansoprazole (FIRST) oral suspension 30 mg, 30 mg, Per G Tube, Daily, Lani Weldon APRN  •  LORazepam (ATIVAN) injection 1 mg, 1 mg, Intravenous, Q4H PRN, Gerard Payne MD  •  methylPREDNISolone sodium succinate (SOLU-Medrol) injection 40 mg, 40 mg, Intravenous, Q12H, Lani Weldon APRN  •  ondansetron (ZOFRAN) injection 4 mg, 4 mg, Intravenous, Q6H PRN, Gerard Payne MD  •  oxybutynin (DITROPAN) tablet 5 mg, 5 mg, Per G Tube, TID, Gerard Payne MD  •  polyethylene glycol (MIRALAX) packet 17 g, 17 g, Per G Tube, Daily, Gerard Payne MD  •  racemic epinephrine (RACEPINEPHRINE) nebulizer solution 0.5 mL, 0.5 mL, Nebulization, Q4H PRN, Marylu Marc APRN  •  risperiDONE (risperDAL) tablet 0.5 mg, 0.5 mg, Per G Tube, Nightly, Gerard Payne MD  •  saccharomyces boulardii (FLORASTOR) capsule 250 mg, 250 mg, Oral, BID, Gerard Payne MD  •  sodium chloride 0.9 % flush 10 mL, 10 mL, Intravenous, Q12H, Gerard Payne MD  •  sodium chloride 0.9 % flush 10 mL, 10 mL, Intravenous, Q12H, Gerard Payne MD  •  sodium chloride 0.9 %  "flush 10 mL, 10 mL, Intravenous, Q12H, Gerard Payne MD  •  sodium chloride 0.9 % flush 10 mL, 10 mL, Intravenous, PRN, Gerard Payne MD  •  sodium chloride 0.9 % flush 20 mL, 20 mL, Intravenous, PRN, Gerard Payne MD  •  sodium chloride nasal spray 1 spray, 1 spray, Each Nare, PRN, Gerard Payne MD  •  Laurie's amazing butt cream, , Topical, BID, Disha Longo APRN  •  Laurie's amazing butt cream, , Topical, PRN, Disha Longo APRN    Data:     Code Status:    There are no questions and answers to display.       No family history on file.    Social History     Socioeconomic History   • Marital status: Unknown     Spouse name: Not on file   • Number of children: Not on file   • Years of education: Not on file   • Highest education level: Not on file       Vitals:  Ht 157.5 cm (62.01\")   Wt 108 kg (238 lb 1.6 oz)   BMI 43.54 kg/m²   T 98.9 P 101 R 16 /69 Sp02 93% (2 lpm)      I/O (24Hr):  No intake or output data in the 24 hours ending 09/12/20 0531    Labs and imaging:      Recent Results (from the past 12 hour(s))   POC Glucose Once    Collection Time: 09/11/20  5:46 PM    Specimen: Blood   Result Value Ref Range    Glucose 172 (H) 70 - 130 mg/dL   POC Glucose Once    Collection Time: 09/11/20  8:06 PM    Specimen: Blood   Result Value Ref Range    Glucose 160 (H) 70 - 130 mg/dL   POC Glucose Once    Collection Time: 09/12/20 12:19 AM    Specimen: Blood   Result Value Ref Range    Glucose 167 (H) 70 - 130 mg/dL         Physical Examination:        Physical Exam   Constitutional: She appears well-developed and well-nourished.   HENT:   Head: Normocephalic and atraumatic.   Nose: Nose normal.   DHT to left nare  Flushed   Eyes: Pupils are equal, round, and reactive to light. Conjunctivae and EOM are normal.   Neck: Normal range of motion. Neck supple.   Cardiovascular: Normal rate, regular rhythm, normal heart sounds and intact distal pulses. " "  Pulmonary/Chest: Effort normal and breath sounds normal.   Abdominal: Soft. Bowel sounds are normal. She exhibits distension.   Obese   Musculoskeletal: Edema (LUE) present.      Comments: Generalized weakness  Left hemiplegia   Neurological: She is alert.   Interacting appropriately with staff  Oriented to person (\"[facility]\", \"2021\")   Skin: Skin is warm and dry.   Scattered bruising to extremities   Psychiatric: She has a normal mood and affect. Her behavior is normal.   Nursing note and vitals reviewed.        Assessment:            * No active hospital problems. *    Past Medical History:   Diagnosis Date   • Anxiety    • Asthma    • Brain injury (CMS/HCC)    • Bronchitis    • Dysphagia    • GERD (gastroesophageal reflux disease)    • Hypertension    • Obesity    • Pseudobulbar affect    • Rheumatoid arthritis (CMS/HCC)         Plan:        1. Acute hypoxic/hypercapneic respiratory failure  2. DM2 with hyperglycemia  3. Dysphagia  4. HTN  5. Late effects of previous CVA  6. GERD  7. Anxiety  8. RA  9. Morbid obesity  10. S/p aspiration event  11. S/p respiratory arrest  12. Pseudobulbar affect  13. Hypernatremia  14. MRSA sputum    Continue current treatment. Monitor counts. Increase activity as tolerated. Continue nutrition support via AMONs. Continue TPN for now - family does not wish to pursue feeding tube placement. Labs Monday. Maintain patient safety. Oxygen weaning as tolerated.  Steroid weaning. SLP eval and treat  Continue bowel regimen.  Watch off antibiotics.       Electronically signed by BEAU Pillai on 9/12/2020 at 05:31 CDT     I have discussed the care of Makenzie Norman, including pertinent history and exam findings, with the nurse practitioner.    I have seen and examined the patient and the key elements of all parts of the encounter have been performed by me.  I agree with the assessment, plan and orders as documented by BEAU Polanco, after I modified the exam " findings and the plan of treatments and the final version is my approved version of the assessment.        Electronically signed by Gerard Payne MD on 9/12/2020 at 20:47 CDT

## 2020-09-13 LAB
GLUCOSE BLDC GLUCOMTR-MCNC: 110 MG/DL (ref 70–130)
GLUCOSE BLDC GLUCOMTR-MCNC: 134 MG/DL (ref 70–130)
GLUCOSE BLDC GLUCOMTR-MCNC: 148 MG/DL (ref 70–130)
GLUCOSE BLDC GLUCOMTR-MCNC: 161 MG/DL (ref 70–130)
GLUCOSE BLDC GLUCOMTR-MCNC: 88 MG/DL (ref 70–130)

## 2020-09-13 PROCEDURE — 25010000002 HEPARIN (PORCINE) PER 1000 UNITS: Performed by: INTERNAL MEDICINE

## 2020-09-13 PROCEDURE — 63710000001 INSULIN DETEMIR PER 5 UNITS: Performed by: NURSE PRACTITIONER

## 2020-09-13 PROCEDURE — 25010000002 METHYLPREDNISOLONE PER 40 MG: Performed by: NURSE PRACTITIONER

## 2020-09-13 PROCEDURE — 97110 THERAPEUTIC EXERCISES: CPT

## 2020-09-13 PROCEDURE — 82962 GLUCOSE BLOOD TEST: CPT

## 2020-09-14 LAB
ANION GAP SERPL CALCULATED.3IONS-SCNC: 10 MMOL/L (ref 5–15)
BASOPHILS # BLD AUTO: 0.02 10*3/MM3 (ref 0–0.2)
BASOPHILS NFR BLD AUTO: 0.1 % (ref 0–1.5)
BUN SERPL-MCNC: 19 MG/DL (ref 6–20)
BUN/CREAT SERPL: 54.3 (ref 7–25)
CA-I BLD-MCNC: 4.54 MG/DL (ref 4.6–5.4)
CALCIUM SPEC-SCNC: 8.9 MG/DL (ref 8.6–10.5)
CHLORIDE SERPL-SCNC: 100 MMOL/L (ref 98–107)
CO2 SERPL-SCNC: 30 MMOL/L (ref 22–29)
CREAT SERPL-MCNC: 0.35 MG/DL (ref 0.57–1)
CRP SERPL-MCNC: 6.68 MG/DL (ref 0–0.5)
DEPRECATED RDW RBC AUTO: 45.9 FL (ref 37–54)
EOSINOPHIL # BLD AUTO: 0 10*3/MM3 (ref 0–0.4)
EOSINOPHIL NFR BLD AUTO: 0 % (ref 0.3–6.2)
ERYTHROCYTE [DISTWIDTH] IN BLOOD BY AUTOMATED COUNT: 13.8 % (ref 12.3–15.4)
GFR SERPL CREATININE-BSD FRML MDRD: >150 ML/MIN/1.73
GLUCOSE BLDC GLUCOMTR-MCNC: 120 MG/DL (ref 70–130)
GLUCOSE BLDC GLUCOMTR-MCNC: 124 MG/DL (ref 70–130)
GLUCOSE BLDC GLUCOMTR-MCNC: 125 MG/DL (ref 70–130)
GLUCOSE BLDC GLUCOMTR-MCNC: 133 MG/DL (ref 70–130)
GLUCOSE BLDC GLUCOMTR-MCNC: 151 MG/DL (ref 70–130)
GLUCOSE BLDC GLUCOMTR-MCNC: 192 MG/DL (ref 70–130)
GLUCOSE SERPL-MCNC: 126 MG/DL (ref 65–99)
HCT VFR BLD AUTO: 37.9 % (ref 34–46.6)
HGB BLD-MCNC: 12.2 G/DL (ref 12–15.9)
IMM GRANULOCYTES # BLD AUTO: 0.1 10*3/MM3 (ref 0–0.05)
IMM GRANULOCYTES NFR BLD AUTO: 0.6 % (ref 0–0.5)
LYMPHOCYTES # BLD AUTO: 0.47 10*3/MM3 (ref 0.7–3.1)
LYMPHOCYTES NFR BLD AUTO: 2.8 % (ref 19.6–45.3)
Lab: ABNORMAL
MAGNESIUM SERPL-MCNC: 2 MG/DL (ref 1.6–2.6)
MCH RBC QN AUTO: 29.3 PG (ref 26.6–33)
MCHC RBC AUTO-ENTMCNC: 32.2 G/DL (ref 31.5–35.7)
MCV RBC AUTO: 90.9 FL (ref 79–97)
MONOCYTES # BLD AUTO: 0.61 10*3/MM3 (ref 0.1–0.9)
MONOCYTES NFR BLD AUTO: 3.6 % (ref 5–12)
NEUTROPHILS NFR BLD AUTO: 15.71 10*3/MM3 (ref 1.7–7)
NEUTROPHILS NFR BLD AUTO: 92.9 % (ref 42.7–76)
NRBC BLD AUTO-RTO: 0 /100 WBC (ref 0–0.2)
NT-PROBNP SERPL-MCNC: 787.6 PG/ML (ref 0–900)
PHOSPHATE SERPL-MCNC: 3.5 MG/DL (ref 2.5–4.5)
PLATELET # BLD AUTO: 141 10*3/MM3 (ref 140–450)
PMV BLD AUTO: 12.7 FL (ref 6–12)
POTASSIUM SERPL-SCNC: 3.8 MMOL/L (ref 3.5–5.2)
PREALB SERPL-MCNC: 28 MG/DL (ref 20–40)
RBC # BLD AUTO: 4.17 10*6/MM3 (ref 3.77–5.28)
SODIUM SERPL-SCNC: 140 MMOL/L (ref 136–145)
TRIGL SERPL-MCNC: 202 MG/DL (ref 0–150)
WBC # BLD AUTO: 16.91 10*3/MM3 (ref 3.4–10.8)

## 2020-09-14 PROCEDURE — 85025 COMPLETE CBC W/AUTO DIFF WBC: CPT | Performed by: INTERNAL MEDICINE

## 2020-09-14 PROCEDURE — 83880 ASSAY OF NATRIURETIC PEPTIDE: CPT | Performed by: INTERNAL MEDICINE

## 2020-09-14 PROCEDURE — 86140 C-REACTIVE PROTEIN: CPT | Performed by: INTERNAL MEDICINE

## 2020-09-14 PROCEDURE — 84100 ASSAY OF PHOSPHORUS: CPT | Performed by: INTERNAL MEDICINE

## 2020-09-14 PROCEDURE — 25010000002 METHYLPREDNISOLONE PER 40 MG: Performed by: NURSE PRACTITIONER

## 2020-09-14 PROCEDURE — 84478 ASSAY OF TRIGLYCERIDES: CPT | Performed by: INTERNAL MEDICINE

## 2020-09-14 PROCEDURE — 80048 BASIC METABOLIC PNL TOTAL CA: CPT | Performed by: INTERNAL MEDICINE

## 2020-09-14 PROCEDURE — 84134 ASSAY OF PREALBUMIN: CPT | Performed by: INTERNAL MEDICINE

## 2020-09-14 PROCEDURE — 82962 GLUCOSE BLOOD TEST: CPT

## 2020-09-14 PROCEDURE — 97110 THERAPEUTIC EXERCISES: CPT

## 2020-09-14 PROCEDURE — 63710000001 INSULIN LISPRO (HUMAN) PER 5 UNITS: Performed by: NURSE PRACTITIONER

## 2020-09-14 PROCEDURE — 25010000002 HEPARIN (PORCINE) PER 1000 UNITS: Performed by: INTERNAL MEDICINE

## 2020-09-14 PROCEDURE — 63710000001 INSULIN DETEMIR PER 5 UNITS: Performed by: NURSE PRACTITIONER

## 2020-09-14 PROCEDURE — 83735 ASSAY OF MAGNESIUM: CPT | Performed by: INTERNAL MEDICINE

## 2020-09-14 PROCEDURE — 82330 ASSAY OF CALCIUM: CPT

## 2020-09-14 PROCEDURE — 25010000002 LORAZEPAM PER 2 MG: Performed by: INTERNAL MEDICINE

## 2020-09-14 RX ORDER — METHYLPREDNISOLONE SODIUM SUCCINATE 40 MG/ML
40 INJECTION, POWDER, LYOPHILIZED, FOR SOLUTION INTRAMUSCULAR; INTRAVENOUS DAILY
Status: DISCONTINUED | OUTPATIENT
Start: 2020-09-15 | End: 2020-09-17 | Stop reason: ALTCHOICE

## 2020-09-14 NOTE — PROGRESS NOTES
"[facility]  ABENA Payne M.D.  BEAU Corona        Internal Medicine Progress Note    9/14/2020   13:57 CDT    Name:  Makenzie Norman  MRN:    [id number]     Acct:     [id number]   Room:  [address] [place] [address] Day: 0     Admit Date: 9/1/2020  2:09 PM  PCP: Provider, No Known    Subjective:     C/C: need for continued oxygen weaning and nutrition support.     Interval History: Status:improved. Resting in bed.  No family at bedside. Alert and interacting with staff. Confused, but at baseline. Tolerating cool aerosol. 02 sats stable. Anxious and asking me to help her \"get out of here.\"  Blood sugars stable. Afebrile. TPN has been on hold.   Review of Systems   Unable to perform ROS: mental status change         Medications:     Allergies:   Allergies   Allergen Reactions   • Valium [Diazepam] Unknown - Low Severity   • Xanax [Alprazolam] Unknown - Low Severity   • Morphine Anxiety       Current Meds:   Current Facility-Administered Medications:   •  acetaminophen (TYLENOL) tablet 650 mg, 650 mg, Oral, Q6H PRN **OR** acetaminophen (TYLENOL) suppository 650 mg, 650 mg, Rectal, Q4H PRN, Gerard Payne MD  •  Adult Standard Central TPN, , Intravenous, Q24H (TPN) **AND** Fat Emulsion Plant Based (INTRALIPID,LIPOSYN) 20 % infusion 50 g, 250 mL, Intravenous, Q24H (TPN), Gerard Payne MD  •  carvedilol (COREG) tablet 6.25 mg, 6.25 mg, Per G Tube, BID With Meals, Gerard Payne MD  •  dextrose (D50W) 25 g/ 50mL Intravenous Solution 25 g, 25 g, Intravenous, Q15 Min PRN, Gerard Payne MD  •  dextrose (GLUTOSE) oral gel 15 g, 15 g, Oral, Q15 Min PRN, Gerard Payne MD  •  docusate sodium (COLACE) capsule 100 mg, 100 mg, Per G Tube, BID, Gerard Payne MD  •  furosemide (LASIX) injection 40 mg, 40 mg, Intravenous, Once, Gerard Payne MD  •  glucagon (human recombinant) (GLUCAGEN DIAGNOSTIC) injection 1 mg, 1 mg, Subcutaneous, Q15 Min PRN, Gerard Payne MD  •  " guaiFENesin (ROBITUSSIN) 100 MG/5ML oral solution 200 mg, 200 mg, Per G Tube, Q6H, Gerard Payne MD  •  heparin (porcine) 5000 UNIT/ML injection 5,000 Units, 5,000 Units, Subcutaneous, Q8H, Gerard Payne MD  •  hydrALAZINE (APRESOLINE) tablet 25 mg, 25 mg, Per G Tube, Q8H, Lani Weldon APRN  •  insulin detemir (LEVEMIR) injection 5 Units, 5 Units, Subcutaneous, Nightly, Lani Weldon APRN  •  insulin lispro (humaLOG) injection 0-24 Units, 0-24 Units, Subcutaneous, Q6H, Lani Weldon APRN  •  ipratropium-albuterol (DUO-NEB) nebulizer solution 3 mL, 3 mL, Nebulization, Q4H - RT, Gerard Payne MD  •  labetalol (NORMODYNE,TRANDATE) injection 20 mg, 20 mg, Intravenous, Q4H PRN, Gerard Payne MD  •  lactulose solution 20 g, 20 g, Per G Tube, Once, Lani Weldon APRN  •  lactulose solution 20 g, 20 g, Per G Tube, Daily PRN, Lani Weldon APRN  •  lansoprazole (FIRST) oral suspension 30 mg, 30 mg, Per G Tube, Daily, Lani Weldon APRN  •  LORazepam (ATIVAN) injection 1 mg, 1 mg, Intravenous, Q4H PRN, Gerard Payne MD  •  methylPREDNISolone sodium succinate (SOLU-Medrol) injection 40 mg, 40 mg, Intravenous, Q12H, Lani Weldon APRN  •  ondansetron (ZOFRAN) injection 4 mg, 4 mg, Intravenous, Q6H PRN, Gerard Payne MD  •  oxybutynin (DITROPAN) tablet 5 mg, 5 mg, Per G Tube, TID, Gerard Payne MD  •  polyethylene glycol (MIRALAX) packet 17 g, 17 g, Per G Tube, Daily, Gerard Payne MD  •  racemic epinephrine (RACEPINEPHRINE) nebulizer solution 0.5 mL, 0.5 mL, Nebulization, Q4H PRN, Marylu Marc, APRN  •  risperiDONE (risperDAL) tablet 0.5 mg, 0.5 mg, Per G Tube, Nightly, Gerard Payne MD  •  saccharomyces boulardii (FLORASTOR) capsule 250 mg, 250 mg, Oral, BID, Gerard Payne MD  •  sodium chloride 0.9 % flush 10 mL, 10 mL, Intravenous, Q12H, Gerard Payne MD  •  sodium  "chloride 0.9 % flush 10 mL, 10 mL, Intravenous, Q12H, Gerard Payne MD  •  sodium chloride 0.9 % flush 10 mL, 10 mL, Intravenous, Q12H, Gerard Payne MD  •  sodium chloride 0.9 % flush 10 mL, 10 mL, Intravenous, PRN, Gerard Payne MD  •  sodium chloride 0.9 % flush 20 mL, 20 mL, Intravenous, PRN, Gerard Payne MD  •  sodium chloride nasal spray 1 spray, 1 spray, Each Nare, ABDIRIZAK, Gerard Payne MD  •  Laurie's amazing butt cream, , Topical, BID, Disha Longo APRN  •  Laurie's amazing butt cream, , Topical, PRN, Disha Longo APRN    Data:     Code Status:    There are no questions and answers to display.       No family history on file.    Social History     Socioeconomic History   • Marital status: Unknown     Spouse name: Not on file   • Number of children: Not on file   • Years of education: Not on file   • Highest education level: Not on file       Vitals:  Ht 157.5 cm (62.01\")   Wt 108 kg (238 lb 1.6 oz)   BMI 43.54 kg/m²   T 97.9 P 107 R 18 /69 Sp02 94% (28% aerosol mask)       I/O (24Hr):  No intake or output data in the 24 hours ending 09/14/20 1357    Labs and imaging:      Recent Results (from the past 12 hour(s))   Calcium, Ionized    Collection Time: 09/14/20  4:49 AM    Specimen: Blood   Result Value Ref Range    Ionized Calcium 4.54 (L) 4.60 - 5.40 mg/dL    Collected by [id number]    Phosphorus    Collection Time: 09/14/20  5:01 AM    Specimen: Blood   Result Value Ref Range    Phosphorus 3.5 2.5 - 4.5 mg/dL   Magnesium    Collection Time: 09/14/20  5:01 AM    Specimen: Blood   Result Value Ref Range    Magnesium 2.0 1.6 - 2.6 mg/dL   C-reactive Protein    Collection Time: 09/14/20  5:01 AM    Specimen: Blood   Result Value Ref Range    C-Reactive Protein 6.68 (H) 0.00 - 0.50 mg/dL   Triglycerides    Collection Time: 09/14/20  5:01 AM    Specimen: Blood   Result Value Ref Range    Triglycerides 202 (H) 0 - 150 mg/dL   Basic " Metabolic Panel    Collection Time: 09/14/20  5:01 AM    Specimen: Blood   Result Value Ref Range    Glucose 126 (H) 65 - 99 mg/dL    BUN 19 6 - 20 mg/dL    Creatinine 0.35 (L) 0.57 - 1.00 mg/dL    Sodium 140 136 - 145 mmol/L    Potassium 3.8 3.5 - 5.2 mmol/L    Chloride 100 98 - 107 mmol/L    CO2 30.0 (H) 22.0 - 29.0 mmol/L    Calcium 8.9 8.6 - 10.5 mg/dL    eGFR Non African Amer >150 >60 mL/min/1.73    BUN/Creatinine Ratio 54.3 (H) 7.0 - 25.0    Anion Gap 10.0 5.0 - 15.0 mmol/L   BNP    Collection Time: 09/14/20  5:01 AM    Specimen: Blood   Result Value Ref Range    proBNP 787.6 0.0 - 900.0 pg/mL   CBC Auto Differential    Collection Time: 09/14/20  5:02 AM    Specimen: Blood   Result Value Ref Range    WBC 16.91 (H) 3.40 - 10.80 10*3/mm3    RBC 4.17 3.77 - 5.28 10*6/mm3    Hemoglobin 12.2 12.0 - 15.9 g/dL    Hematocrit 37.9 34.0 - 46.6 %    MCV 90.9 79.0 - 97.0 fL    MCH 29.3 26.6 - 33.0 pg    MCHC 32.2 31.5 - 35.7 g/dL    RDW 13.8 12.3 - 15.4 %    RDW-SD 45.9 37.0 - 54.0 fl    MPV 12.7 (H) 6.0 - 12.0 fL    Platelets 141 140 - 450 10*3/mm3    Neutrophil % 92.9 (H) 42.7 - 76.0 %    Lymphocyte % 2.8 (L) 19.6 - 45.3 %    Monocyte % 3.6 (L) 5.0 - 12.0 %    Eosinophil % 0.0 (L) 0.3 - 6.2 %    Basophil % 0.1 0.0 - 1.5 %    Immature Grans % 0.6 (H) 0.0 - 0.5 %    Neutrophils, Absolute 15.71 (H) 1.70 - 7.00 10*3/mm3    Lymphocytes, Absolute 0.47 (L) 0.70 - 3.10 10*3/mm3    Monocytes, Absolute 0.61 0.10 - 0.90 10*3/mm3    Eosinophils, Absolute 0.00 0.00 - 0.40 10*3/mm3    Basophils, Absolute 0.02 0.00 - 0.20 10*3/mm3    Immature Grans, Absolute 0.10 (H) 0.00 - 0.05 10*3/mm3    nRBC 0.0 0.0 - 0.2 /100 WBC   POC Glucose Once    Collection Time: 09/14/20  6:04 AM    Specimen: Blood   Result Value Ref Range    Glucose 151 (H) 70 - 130 mg/dL   POC Glucose Once    Collection Time: 09/14/20 11:39 AM    Specimen: Blood   Result Value Ref Range    Glucose 124 70 - 130 mg/dL         Physical Examination:        Physical Exam  "  Constitutional: She appears well-developed and well-nourished.   HENT:   Head: Normocephalic and atraumatic.   Nose: Nose normal.   DHT to left nare  Flushed   Eyes: Pupils are equal, round, and reactive to light. Conjunctivae and EOM are normal.   Neck: Normal range of motion. Neck supple.   Cardiovascular: Normal rate, regular rhythm, normal heart sounds and intact distal pulses.   Pulmonary/Chest: Effort normal and breath sounds normal.   Abdominal: Soft. Bowel sounds are normal. She exhibits distension.   Obese   Musculoskeletal: Edema (LUE) present.      Comments: Generalized weakness  Left hemiplegia   Neurological: She is alert.   Interacting appropriately with staff  Oriented to person and place (\"2021\")   Skin: Skin is warm and dry.   Scattered bruising to extremities   Psychiatric: She has a normal mood and affect. Her behavior is normal.   Nursing note and vitals reviewed.        Assessment:            * No active hospital problems. *    Past Medical History:   Diagnosis Date   • Anxiety    • Asthma    • Brain injury (CMS/HCC)    • Bronchitis    • Dysphagia    • GERD (gastroesophageal reflux disease)    • Hypertension    • Obesity    • Pseudobulbar affect    • Rheumatoid arthritis (CMS/HCC)         Plan:        1. Acute hypoxic/hypercapneic respiratory failure  2. DM2 with hyperglycemia  3. Dysphagia  4. HTN  5. Late effects of previous CVA  6. GERD  7. Anxiety  8. RA  9. Morbid obesity  10. S/p aspiration event  11. S/p respiratory arrest  12. Pseudobulbar affect  13. Hypernatremia  14. MRSA sputum    Continue current treatment. Monitor counts. Increase activity as tolerated. Continue nutrition support via AMONs. Continue TPN for now - family does not wish to pursue feeding tube placement. Labs Thursday. Maintain patient safety. Oxygen weaning as tolerated.  Steroid weaning.   Continue bowel regimen.  Watch off antibiotics. Monitor closely for respiratory distress.       Electronically signed by Lani " BEAU Gardner on 9/14/2020 at 13:57 CDT   I have discussed the care of Makenzie Norman, including pertinent history and exam findings, with the nurse practitioner.    I have seen and examined the patient and the key elements of all parts of the encounter have been performed by me.  I agree with the assessment, plan and orders as documented by BEAU Corona, after I modified the exam findings and the plan of treatments and the final version is my approved version of the assessment.        Electronically signed by Gerard Payne MD on 9/14/2020 at 19:24 CDT

## 2020-09-14 NOTE — PROGRESS NOTES
Elmer Payne M.D.  BEAU Corona        Internal Medicine Progress Note    9/13/2020   20:39 CDT    Name:  Makenzie Norman  MRN:    [id number]     Acct:     [id number]   Room:  [address] [place] [address] Day: 0     Admit Date: 9/1/2020  2:09 PM  PCP: Provider, No Known    Subjective:     C/C: need for continued oxygen weaning and nutrition support.     Interval History: Status:improved. Resting in bed.  No family at bedside. Alert and interacting with staff. Confused, but at baseline. Currently on 28% cool mist aerosol due to having respiratory distress during the night requiring racemic epi nebulizer and lasix.      Review of Systems   Unable to perform ROS: mental status change         Medications:     Allergies:   Allergies   Allergen Reactions   • Valium [Diazepam] Unknown - Low Severity   • Xanax [Alprazolam] Unknown - Low Severity   • Morphine Anxiety       Current Meds:   Current Facility-Administered Medications:   •  acetaminophen (TYLENOL) tablet 650 mg, 650 mg, Oral, Q6H PRN **OR** acetaminophen (TYLENOL) suppository 650 mg, 650 mg, Rectal, Q4H PRN, Gerard Payne MD  •  carvedilol (COREG) tablet 6.25 mg, 6.25 mg, Per G Tube, BID With Meals, Gerard Payne MD  •  dextrose (D50W) 25 g/ 50mL Intravenous Solution 25 g, 25 g, Intravenous, Q15 Min PRN, Gerard Payne MD  •  dextrose (GLUTOSE) oral gel 15 g, 15 g, Oral, Q15 Min PRN, Gerard Payne MD  •  docusate sodium (COLACE) capsule 100 mg, 100 mg, Per G Tube, BID, Gerard Payne MD  •  furosemide (LASIX) injection 40 mg, 40 mg, Intravenous, Once, Gerard Payne MD  •  glucagon (human recombinant) (GLUCAGEN DIAGNOSTIC) injection 1 mg, 1 mg, Subcutaneous, Q15 Min PRN, Gerard Payne MD  •  guaiFENesin (ROBITUSSIN) 100 MG/5ML oral solution 200 mg, 200 mg, Per G Tube, Q6H, Gerard Payne MD  •  heparin (porcine) 5000 UNIT/ML injection 5,000 Units, 5,000 Units, Subcutaneous, Q8H,  Gerard Payne MD  •  hydrALAZINE (APRESOLINE) tablet 25 mg, 25 mg, Per G Tube, Q8H, Lani Weldon APRN  •  insulin detemir (LEVEMIR) injection 5 Units, 5 Units, Subcutaneous, Nightly, Lani Weldon APRN  •  insulin lispro (humaLOG) injection 0-24 Units, 0-24 Units, Subcutaneous, Q6H, Lani Weldon APRN  •  ipratropium-albuterol (DUO-NEB) nebulizer solution 3 mL, 3 mL, Nebulization, Q4H - RT, Gerard Payne MD  •  labetalol (NORMODYNE,TRANDATE) injection 20 mg, 20 mg, Intravenous, Q4H PRN, Gerard Payne MD  •  lactulose solution 20 g, 20 g, Per G Tube, Once, Lani Weldon APRN  •  lactulose solution 20 g, 20 g, Per G Tube, Daily PRN, Lani Weldon APRN  •  lansoprazole (FIRST) oral suspension 30 mg, 30 mg, Per G Tube, Daily, Lani Weldon APRN  •  LORazepam (ATIVAN) injection 1 mg, 1 mg, Intravenous, Q4H PRN, Gerard Payne MD  •  methylPREDNISolone sodium succinate (SOLU-Medrol) injection 40 mg, 40 mg, Intravenous, Q12H, Lani Weldon APRN  •  ondansetron (ZOFRAN) injection 4 mg, 4 mg, Intravenous, Q6H PRN, Gerard Payne MD  •  oxybutynin (DITROPAN) tablet 5 mg, 5 mg, Per G Tube, TID, Gerard Payne MD  •  polyethylene glycol (MIRALAX) packet 17 g, 17 g, Per G Tube, Daily, Gerard Payne MD  •  racemic epinephrine (RACEPINEPHRINE) nebulizer solution 0.5 mL, 0.5 mL, Nebulization, Q4H PRN, Marylu Marc, APRN  •  risperiDONE (risperDAL) tablet 0.5 mg, 0.5 mg, Per G Tube, Nightly, Gerard Payne MD  •  saccharomyces boulardii (FLORASTOR) capsule 250 mg, 250 mg, Oral, BID, Gerard Payne MD  •  sodium chloride 0.9 % flush 10 mL, 10 mL, Intravenous, Q12H, Gerard Payne MD  •  sodium chloride 0.9 % flush 10 mL, 10 mL, Intravenous, Q12H, Gerard Payne MD  •  sodium chloride 0.9 % flush 10 mL, 10 mL, Intravenous, Q12H, Gerard Payne MD  •  sodium chloride 0.9 % flush 10  "mL, 10 mL, Intravenous, PRN, Gerard Payne MD  •  sodium chloride 0.9 % flush 20 mL, 20 mL, Intravenous, PRN, Gerard Payne MD  •  sodium chloride nasal spray 1 spray, 1 spray, Each Nare, ABDIRIZAK, Gerard Payne MD  •  Laurie's amazing butt cream, , Topical, BID, Disha Longo, APRN  •  Laurie's amazing butt cream, , Topical, PRN, Disha Longo, APRN    Data:     Code Status:    There are no questions and answers to display.       No family history on file.    Social History     Socioeconomic History   • Marital status: Unknown     Spouse name: Not on file   • Number of children: Not on file   • Years of education: Not on file   • Highest education level: Not on file       Vitals:  Ht 157.5 cm (62.01\")   Wt 108 kg (238 lb 1.6 oz)   BMI 43.54 kg/m²   T 98.6 P 102 R 16 /70 Sp02 96% (28% aerosol mask)       I/O (24Hr):  No intake or output data in the 24 hours ending 09/13/20 2039    Labs and imaging:      Recent Results (from the past 12 hour(s))   POC Glucose Once    Collection Time: 09/13/20 12:09 PM    Specimen: Blood   Result Value Ref Range    Glucose 148 (H) 70 - 130 mg/dL   POC Glucose Once    Collection Time: 09/13/20  6:05 PM    Specimen: Blood   Result Value Ref Range    Glucose 88 70 - 130 mg/dL   POC Glucose Once    Collection Time: 09/13/20  7:49 PM    Specimen: Blood   Result Value Ref Range    Glucose 110 70 - 130 mg/dL         Physical Examination:        Physical Exam   Constitutional: She appears well-developed and well-nourished.   HENT:   Head: Normocephalic and atraumatic.   Nose: Nose normal.   DHT to left nare  Flushed   Eyes: Pupils are equal, round, and reactive to light. Conjunctivae and EOM are normal.   Neck: Normal range of motion. Neck supple.   Cardiovascular: Normal rate, regular rhythm, normal heart sounds and intact distal pulses.   Pulmonary/Chest: Effort normal and breath sounds normal.   Abdominal: Soft. Bowel sounds are normal. " "She exhibits distension.   Obese   Musculoskeletal: Edema (LUE) present.      Comments: Generalized weakness  Left hemiplegia   Neurological: She is alert.   Interacting appropriately with staff  Oriented to person (\"[facility]\", \"2021\")   Skin: Skin is warm and dry.   Scattered bruising to extremities   Psychiatric: She has a normal mood and affect. Her behavior is normal.   Nursing note and vitals reviewed.        Assessment:            * No active hospital problems. *    Past Medical History:   Diagnosis Date   • Anxiety    • Asthma    • Brain injury (CMS/HCC)    • Bronchitis    • Dysphagia    • GERD (gastroesophageal reflux disease)    • Hypertension    • Obesity    • Pseudobulbar affect    • Rheumatoid arthritis (CMS/HCC)         Plan:        1. Acute hypoxic/hypercapneic respiratory failure  2. DM2 with hyperglycemia  3. Dysphagia  4. HTN  5. Late effects of previous CVA  6. GERD  7. Anxiety  8. RA  9. Morbid obesity  10. S/p aspiration event  11. S/p respiratory arrest  12. Pseudobulbar affect  13. Hypernatremia  14. MRSA sputum    Continue current treatment. Monitor counts. Increase activity as tolerated. Continue nutrition support via AMONs. Continue TPN for now - family does not wish to pursue feeding tube placement. Labs Monday. Maintain patient safety. Oxygen weaning as tolerated.  Steroid weaning. SLP eval and treat  Continue bowel regimen.  Watch off antibiotics. Monitor closely for respiratory distress.       Electronically signed by BEAU Pillai on 9/13/2020 at 20:39 CDT   I have discussed the care of Makenzie Norman, including pertinent history and exam findings, with the nurse practitioner.    I have seen and examined the patient and the key elements of all parts of the encounter have been performed by me.  I agree with the assessment, plan and orders as documented by BEAU Polanco, after I modified the exam findings and the plan of treatments and the final version is my " approved version of the assessment.        Electronically signed by Gerard Payne MD on 9/13/2020 at 23:18 CDT

## 2020-09-14 NOTE — PROGRESS NOTES
Adult Nutrition  Assessment/PES    Patient Name:  Makenzie Norman  YOB: 1960  MRN: [id number]  Admit Date:  9/1/2020    Assessment Date:  9/14/2020    Comments:  Pt has been on TPN/Lipids, it was discontinued over the weekend d/t fluid overload. Per APRN/CN, TPN/Lipids can be restarted today; resumed previous order. SLP has been working with pt however at this time she is not appropriate for PO diet. Will continue to follow and monitor pertinent labs.     Reason for Assessment     Row Name 09/14/20 1251          Reason for Assessment    Reason For Assessment  follow-up protocol         Nutrition/Diet History     Row Name 09/14/20 1258          Nutrition/Diet History    Typical Food/Fluid Intake  Pt's TPN/Lipids were discontinued over the weekend secondary to volume overload. Per CN, APRN states TPN can be restarted today. Resumed previous order.     Factors Affecting Nutritional Intake  difficulty/impaired swallowing         Anthropometrics     Row Name 09/14/20 1259          Usual Body Weight (UBW)    Weight Loss Time Frame  current wt 238#        Body Mass Index (BMI)    BMI Assessment  BMI 40 or greater: obesity grade III         Labs/Tests/Procedures/Meds     Row Name 09/14/20 1259          Labs/Procedures/Meds    Lab Results Reviewed  reviewed, pertinent     Lab Results Comments  glucose; BUN/Cr; triglycerides; crp wbc        Medications    Pertinent Medications Reviewed  reviewed, pertinent     Pertinent Medications Comments  TPN/Lipids resumed tonight; colace; lasix; insulin; steroid; miralax; florastor         Physical Findings     Row Name 09/14/20 1301          Physical Findings    Overall Physical Appearance  obese;on oxygen therapy Weakness     Gastrointestinal  other (see comments) Last BM 8/31 -- also noted as unknown??     Skin  other (see comments);edema Wounds noted. [email or web address]           Nutrition Prescription Ordered     Row Name 09/14/20 1301          Nutrition Prescription PO     Current PO Diet  NPO        Nutrition Prescription PN    PN Route  Central     PN Goal Rate (mL/hr)  50 mL/hr     PN Goal Volume (mL)  1200 mL     Dextrose Concentration (%)  15 %     Dextrose (Kcal)  612     Amino Acid Concentration (%)  5 %     Amino Acid (gm)  60     Lipid Concentration (%)  20%     Lipid Volume (mL)  250 mL     Lipid Frequency  Daily         Evaluation of Received Nutrient/Fluid Intake     Row Name 09/14/20 1302          Nutrient/Fluid Evaluation    Number of Days Evaluated  2 days     Additional Documentation  Fluid Intake Evaluation (Group)        Fluid Intake Evaluation    Free Water Flush Fluid (mL)  220     IV Fluid (mL)  860               Problem/Interventions:  Problem 1     Row Name 09/14/20 1302          Nutrition Diagnoses Problem 1    Problem 1  Needs Alternate Route     Etiology (related to)  Medical Diagnosis     Neurological  Other (comment);TBI;CVA h/o TBI; CVA     Pulmonary/Critical Care  Acute respiratory failure;Hypercapnea;Hypoxemia;Pneumonia     Signs/Symptoms (evidenced by)  No EN Route Available;Other (comment) central line for TPN/lipids. Family doesn't wish to pursue feeding tube placement.                Intervention Goal     Row Name 09/14/20 1303          Intervention Goal    General  Nutrition support treatment;Disease management/therapy;Reduce/improve symptoms;Meet nutritional needs for age/condition     TF/PN  Inititiate TF/PN     Transition  PN to PO     Weight  No significant weight loss         Nutrition Intervention     Row Name 09/14/20 1303          Nutrition Intervention    RD/Tech Action  Follow Tx progress;Care plan reviewd;Recommend/ordered     Recommended/Ordered  PN         Nutrition Prescription     Row Name 09/14/20 1303          Nutrition Prescription PN    Parenteral Prescription  Other (comment) Resumed previous TPN/Lipid order         Education/Evaluation     Row Name 09/14/20 1304          Education    Education  No discharge needs identified at  this time        Monitor/Evaluation    Monitor  Per protocol;PN delivery/tolerance;Pertinent labs           Electronically signed by:  Chitra Davis  09/14/20 13:05 CDT

## 2020-09-15 ENCOUNTER — APPOINTMENT (OUTPATIENT)
Dept: GENERAL RADIOLOGY | Facility: HOSPITAL | Age: 60
End: 2020-09-15

## 2020-09-15 LAB
GLUCOSE BLDC GLUCOMTR-MCNC: 127 MG/DL (ref 70–130)
GLUCOSE BLDC GLUCOMTR-MCNC: 146 MG/DL (ref 70–130)
GLUCOSE BLDC GLUCOMTR-MCNC: 148 MG/DL (ref 70–130)
GLUCOSE BLDC GLUCOMTR-MCNC: 159 MG/DL (ref 70–130)

## 2020-09-15 PROCEDURE — 92610 EVALUATE SWALLOWING FUNCTION: CPT

## 2020-09-15 PROCEDURE — 63710000001 INSULIN DETEMIR PER 5 UNITS: Performed by: NURSE PRACTITIONER

## 2020-09-15 PROCEDURE — 71045 X-RAY EXAM CHEST 1 VIEW: CPT

## 2020-09-15 PROCEDURE — 97110 THERAPEUTIC EXERCISES: CPT

## 2020-09-15 PROCEDURE — 63710000001 INSULIN LISPRO (HUMAN) PER 5 UNITS: Performed by: NURSE PRACTITIONER

## 2020-09-15 PROCEDURE — 82962 GLUCOSE BLOOD TEST: CPT

## 2020-09-15 PROCEDURE — 25010000002 METHYLPREDNISOLONE PER 40 MG: Performed by: NURSE PRACTITIONER

## 2020-09-15 PROCEDURE — 25010000002 HEPARIN (PORCINE) PER 1000 UNITS: Performed by: INTERNAL MEDICINE

## 2020-09-15 NOTE — PROGRESS NOTES
Elmer Payne M.D.  BEAU Corona        Internal Medicine Progress Note    9/15/2020   13:52 CDT    Name:  Makenzie Norman  MRN:    [id number]     Acct:     [id number]   Room:  [facility] Day: 0     Admit Date: 9/1/2020  2:09 PM  PCP: Provider, No Known    Subjective:     C/C: need for continued oxygen weaning and nutrition support.     Interval History: Status:improved. Resting in bed.  No family at bedside. Alert and interacting with staff. Confused, but at baseline. Tolerating cool aerosol. 02 sats stable on room air. Has pulled DHT x 2 today. SLP eval pending. Afebrile.   Review of Systems   Constitution: Positive for malaise/fatigue. Negative for chills, decreased appetite, weight gain and weight loss.   HENT: Negative for congestion, ear discharge, hoarse voice and tinnitus.    Eyes: Negative for blurred vision, discharge, visual disturbance and visual halos.   Cardiovascular: Negative for chest pain, claudication, dyspnea on exertion, irregular heartbeat, leg swelling, orthopnea and paroxysmal nocturnal dyspnea.   Respiratory: Negative for cough, shortness of breath, sputum production and wheezing.    Endocrine: Negative for cold intolerance, heat intolerance and polyuria.   Hematologic/Lymphatic: Negative for adenopathy. Does not bruise/bleed easily.   Skin: Negative for dry skin, itching and suspicious lesions.   Musculoskeletal: Negative for arthritis, back pain, falls, joint pain, muscle weakness and myalgias.   Gastrointestinal: Negative for abdominal pain, constipation, diarrhea, dysphagia and hematemesis.   Genitourinary: Negative for bladder incontinence, dysuria and frequency.   Neurological: Negative for aphonia, disturbances in coordination, dizziness and weakness.   Psychiatric/Behavioral: Negative for altered mental status, depression, memory loss and substance abuse. The patient does not have insomnia and is not nervous/anxious.          Medications:     Allergies:    Allergies   Allergen Reactions   • Valium [Diazepam] Unknown - Low Severity   • Xanax [Alprazolam] Unknown - Low Severity   • Morphine Anxiety       Current Meds:   Current Facility-Administered Medications:   •  acetaminophen (TYLENOL) tablet 650 mg, 650 mg, Oral, Q6H PRN **OR** acetaminophen (TYLENOL) suppository 650 mg, 650 mg, Rectal, Q4H PRN, Gerard Payne MD  •  Adult Standard Central TPN, , Intravenous, Q24H (TPN) **AND** Fat Emulsion Plant Based (INTRALIPID,LIPOSYN) 20 % infusion 50 g, 250 mL, Intravenous, Q24H (TPN), Gerard Payne MD  •  carvedilol (COREG) tablet 6.25 mg, 6.25 mg, Per G Tube, BID With Meals, Gerard Payne MD  •  dextrose (D50W) 25 g/ 50mL Intravenous Solution 25 g, 25 g, Intravenous, Q15 Min PRN, Gerard Payne MD  •  dextrose (GLUTOSE) oral gel 15 g, 15 g, Oral, Q15 Min PRN, Gerard Payne MD  •  docusate sodium (COLACE) capsule 100 mg, 100 mg, Per G Tube, BID, Gerard Payne MD  •  furosemide (LASIX) injection 40 mg, 40 mg, Intravenous, Once, Gerard Payne MD  •  glucagon (human recombinant) (GLUCAGEN DIAGNOSTIC) injection 1 mg, 1 mg, Subcutaneous, Q15 Min PRN, Gerard Payne MD  •  guaiFENesin (ROBITUSSIN) 100 MG/5ML oral solution 200 mg, 200 mg, Per G Tube, Q6H, Gerard Payne MD  •  heparin (porcine) 5000 UNIT/ML injection 5,000 Units, 5,000 Units, Subcutaneous, Q8H, Gerard Payne MD  •  hydrALAZINE (APRESOLINE) tablet 25 mg, 25 mg, Per G Tube, Q8H, Lani Weldon APRN  •  insulin detemir (LEVEMIR) injection 5 Units, 5 Units, Subcutaneous, Nightly, Lani Weldon APRN  •  insulin lispro (humaLOG) injection 0-24 Units, 0-24 Units, Subcutaneous, Q6H, Lani Weldon APRN  •  ipratropium-albuterol (DUO-NEB) nebulizer solution 3 mL, 3 mL, Nebulization, Q4H - RT, Gerard Payne MD  •  labetalol (NORMODYNE,TRANDATE) injection 20 mg, 20 mg, Intravenous, Q4H PRN, Gerard Payne  MD Marcelo  •  lactulose solution 20 g, 20 g, Per G Tube, Once, Lani Weldon, BEAU  •  lactulose solution 20 g, 20 g, Per G Tube, Daily PRN, Lani Weldon APRN  •  lansoprazole (FIRST) oral suspension 30 mg, 30 mg, Per G Tube, Daily, Lani Weldon, APRN  •  methylPREDNISolone sodium succinate (SOLU-Medrol) injection 40 mg, 40 mg, Intravenous, Daily, Lani Weldon APRN  •  ondansetron (ZOFRAN) injection 4 mg, 4 mg, Intravenous, Q6H PRN, Gerard Payne MD  •  oxybutynin (DITROPAN) tablet 5 mg, 5 mg, Per G Tube, TID, Gerard Payne MD  •  polyethylene glycol (MIRALAX) packet 17 g, 17 g, Per G Tube, Daily, Gerard Payne MD  •  racemic epinephrine (RACEPINEPHRINE) nebulizer solution 0.5 mL, 0.5 mL, Nebulization, Q4H PRN, Marylu Marc, APRN  •  risperiDONE (risperDAL) tablet 0.5 mg, 0.5 mg, Per G Tube, Nightly, Gerard Payne MD  •  saccharomyces boulardii (FLORASTOR) capsule 250 mg, 250 mg, Oral, BID, Gerard Payne MD  •  sodium chloride 0.9 % flush 10 mL, 10 mL, Intravenous, Q12H, Gerard Payne MD  •  sodium chloride 0.9 % flush 10 mL, 10 mL, Intravenous, Q12H, Gerard Payne MD  •  sodium chloride 0.9 % flush 10 mL, 10 mL, Intravenous, Q12H, Gerard Payne MD  •  sodium chloride 0.9 % flush 10 mL, 10 mL, Intravenous, PRN, Gerard Payne MD  •  sodium chloride 0.9 % flush 20 mL, 20 mL, Intravenous, PRN, Gerard Payne MD  •  sodium chloride nasal spray 1 spray, 1 spray, Each Nare, PRN, Gerard Payne MD  •  Laurie's amazing butt cream, , Topical, BID, Disha Longo, APRN  •  Laurie's amazing butt cream, , Topical, PRN, Disha Longo, BEAU    Data:     Code Status:    There are no questions and answers to display.       No family history on file.    Social History     Socioeconomic History   • Marital status: Unknown     Spouse name: Not on file   • Number of children: Not on file  "  • Years of education: Not on file   • Highest education level: Not on file       Vitals:  Ht 157.5 cm (62.01\")   Wt 108 kg (238 lb 1.6 oz)   BMI 43.54 kg/m²   T 98.5  P 105 R 20 /69 Sp02 96% (room air)       I/O (24Hr):  No intake or output data in the 24 hours ending 09/15/20 1352    Labs and imaging:      Recent Results (from the past 12 hour(s))   POC Glucose Once    Collection Time: 09/15/20  6:49 AM    Specimen: Blood   Result Value Ref Range    Glucose 159 (H) 70 - 130 mg/dL   POC Glucose Once    Collection Time: 09/15/20 11:23 AM    Specimen: Blood   Result Value Ref Range    Glucose 127 70 - 130 mg/dL         Physical Examination:        Physical Exam   Constitutional: She appears well-developed and well-nourished.   HENT:   Head: Normocephalic and atraumatic.   Nose: Nose normal.   Eyes: Pupils are equal, round, and reactive to light. Conjunctivae and EOM are normal.   Neck: Normal range of motion. Neck supple.   Cardiovascular: Normal rate, regular rhythm, normal heart sounds and intact distal pulses.   Pulmonary/Chest: Effort normal and breath sounds normal.   Abdominal: Soft. Bowel sounds are normal. She exhibits distension.   Obese   Musculoskeletal: Edema (LUE) present.      Comments: Generalized weakness  Left hemiplegia   Neurological: She is alert.   Interacting appropriately with staff  Oriented to person and place (\"2021\")   Skin: Skin is warm and dry.   Scattered bruising to extremities   Psychiatric: She has a normal mood and affect. Her behavior is normal.   Nursing note and vitals reviewed.        Assessment:            * No active hospital problems. *    Past Medical History:   Diagnosis Date   • Anxiety    • Asthma    • Brain injury (CMS/HCC)    • Bronchitis    • Dysphagia    • GERD (gastroesophageal reflux disease)    • Hypertension    • Obesity    • Pseudobulbar affect    • Rheumatoid arthritis (CMS/HCC)         Plan:        1. Acute hypoxic/hypercapneic respiratory " failure  2. DM2 with hyperglycemia  3. Dysphagia  4. HTN  5. Late effects of previous CVA  6. GERD  7. Anxiety  8. RA  9. Morbid obesity  10. S/p aspiration event  11. S/p respiratory arrest  12. Pseudobulbar affect  13. Hypernatremia  14. MRSA sputum    Continue current treatment. Monitor counts. Increase activity as tolerated. Continue nutrition support via AMONs. Continue TPN for now - family does not wish to pursue feeding tube placement. Labs Thursday. Maintain patient safety. Oxygen weaning as tolerated.  Steroid weaning.   Continue bowel regimen.  Watch off antibiotics. Monitor closely for respiratory distress. Await SLP findings and if not safe for po, re-insert DHT for medication administration.       Electronically signed by BEAU Sheppard on 9/15/2020 at 13:52 CDT   I have discussed the care of Makenzie Norman, including pertinent history and exam findings, with the nurse practitioner.    I have seen and examined the patient and the key elements of all parts of the encounter have been performed by me.  I agree with the assessment, plan and orders as documented by BEAU Corona, after I modified the exam findings and the plan of treatments and the final version is my approved version of the assessment.        Electronically signed by Gerard Payne MD on 9/15/2020 at 19:13 CDT

## 2020-09-16 LAB
GLUCOSE BLDC GLUCOMTR-MCNC: 127 MG/DL (ref 70–130)
GLUCOSE BLDC GLUCOMTR-MCNC: 154 MG/DL (ref 70–130)
GLUCOSE BLDC GLUCOMTR-MCNC: 174 MG/DL (ref 70–130)
GLUCOSE BLDC GLUCOMTR-MCNC: 183 MG/DL (ref 70–130)

## 2020-09-16 PROCEDURE — 82962 GLUCOSE BLOOD TEST: CPT

## 2020-09-16 PROCEDURE — 63710000001 INSULIN DETEMIR PER 5 UNITS: Performed by: NURSE PRACTITIONER

## 2020-09-16 PROCEDURE — 63710000001 INSULIN LISPRO (HUMAN) PER 5 UNITS: Performed by: NURSE PRACTITIONER

## 2020-09-16 PROCEDURE — 92526 ORAL FUNCTION THERAPY: CPT

## 2020-09-16 PROCEDURE — 97535 SELF CARE MNGMENT TRAINING: CPT

## 2020-09-16 PROCEDURE — 25010000002 HEPARIN (PORCINE) PER 1000 UNITS: Performed by: INTERNAL MEDICINE

## 2020-09-16 PROCEDURE — 25010000002 METHYLPREDNISOLONE PER 40 MG: Performed by: NURSE PRACTITIONER

## 2020-09-16 NOTE — PROGRESS NOTES
Elmer Payne M.D.  [facility] Corona        Internal Medicine Progress Note    9/16/2020   14:14 CDT    Name:  Makenzie Norman  MRN:    [id number]     Acct:     [id number]   Room:  [facility] Day: 0     Admit Date: 9/1/2020  2:09 PM  PCP: Provider, No Known    Subjective:     C/C: need for continued oxygen weaning and nutrition support.     Interval History: Status:improved. Resting in bed. No family at bedside. Alert and interacting with staff. Confused, but at baseline. Tolerating cool aerosol. 02 sats stable on room air. Started on modified po diet and tolerating without difficulty.  Afebrile.   Review of Systems   Constitution: Positive for malaise/fatigue. Negative for chills, decreased appetite, weight gain and weight loss.   HENT: Negative for congestion, ear discharge, hoarse voice and tinnitus.    Eyes: Negative for blurred vision, discharge, visual disturbance and visual halos.   Cardiovascular: Negative for chest pain, claudication, dyspnea on exertion, irregular heartbeat, leg swelling, orthopnea and paroxysmal nocturnal dyspnea.   Respiratory: Negative for cough, shortness of breath, sputum production and wheezing.    Endocrine: Negative for cold intolerance, heat intolerance and polyuria.   Hematologic/Lymphatic: Negative for adenopathy. Does not bruise/bleed easily.   Skin: Negative for dry skin, itching and suspicious lesions.   Musculoskeletal: Negative for arthritis, back pain, falls, joint pain, muscle weakness and myalgias.   Gastrointestinal: Negative for abdominal pain, constipation, diarrhea, dysphagia and hematemesis.   Genitourinary: Negative for bladder incontinence, dysuria and frequency.   Neurological: Negative for aphonia, disturbances in coordination, dizziness and weakness.   Psychiatric/Behavioral: Negative for altered mental status, depression, memory loss and substance abuse. The patient does not have insomnia and is not nervous/anxious.           Medications:     Allergies:   Allergies   Allergen Reactions   • Valium [Diazepam] Unknown - Low Severity   • Xanax [Alprazolam] Unknown - Low Severity   • Morphine Anxiety       Current Meds:   Current Facility-Administered Medications:   •  acetaminophen (TYLENOL) tablet 650 mg, 650 mg, Oral, Q6H PRN **OR** acetaminophen (TYLENOL) suppository 650 mg, 650 mg, Rectal, Q4H PRN, Gerard Payne MD  •  carvedilol (COREG) tablet 6.25 mg, 6.25 mg, Per G Tube, BID With Meals, Gerard Payne MD  •  dextrose (D50W) 25 g/ 50mL Intravenous Solution 25 g, 25 g, Intravenous, Q15 Min PRN, Gerard Payne MD  •  dextrose (GLUTOSE) oral gel 15 g, 15 g, Oral, Q15 Min PRN, Gerard Payne MD  •  docusate sodium (COLACE) capsule 100 mg, 100 mg, Per G Tube, BID, Gerard Payne MD  •  glucagon (human recombinant) (GLUCAGEN DIAGNOSTIC) injection 1 mg, 1 mg, Subcutaneous, Q15 Min PRN, Gerard Payne MD  •  guaiFENesin (ROBITUSSIN) 100 MG/5ML oral solution 200 mg, 200 mg, Per G Tube, Q6H, Gerard Payne MD  •  heparin (porcine) 5000 UNIT/ML injection 5,000 Units, 5,000 Units, Subcutaneous, Q8H, Gerard Payne MD  •  hydrALAZINE (APRESOLINE) tablet 25 mg, 25 mg, Per G Tube, Q8H, Lani Weldon APRN  •  insulin detemir (LEVEMIR) injection 5 Units, 5 Units, Subcutaneous, Nightly, Lani Weldon APRN  •  insulin lispro (humaLOG) injection 0-24 Units, 0-24 Units, Subcutaneous, Q6H, Lani Weldon APRN  •  ipratropium-albuterol (DUO-NEB) nebulizer solution 3 mL, 3 mL, Nebulization, Q4H - RT, Gerard Payne MD  •  labetalol (NORMODYNE,TRANDATE) injection 20 mg, 20 mg, Intravenous, Q4H PRN, Gerard Payne MD  •  lactulose solution 20 g, 20 g, Per G Tube, Once, Lani Weldon APRN  •  lactulose solution 20 g, 20 g, Per G Tube, Daily PRN, Lani Weldon APRN  •  lansoprazole (FIRST) oral suspension 30 mg, 30 mg, Per G Tube, Daily,  "Lani Weldon APRN  •  methylPREDNISolone sodium succinate (SOLU-Medrol) injection 40 mg, 40 mg, Intravenous, Daily, Lani Weldon APRN  •  ondansetron (ZOFRAN) injection 4 mg, 4 mg, Intravenous, Q6H PRN, Gerard Payne MD  •  oxybutynin (DITROPAN) tablet 5 mg, 5 mg, Per G Tube, TID, Gerard Payne MD  •  polyethylene glycol (MIRALAX) packet 17 g, 17 g, Per G Tube, Daily, Gerard Payne MD  •  racemic epinephrine (RACEPINEPHRINE) nebulizer solution 0.5 mL, 0.5 mL, Nebulization, Q4H PRN, Marylu Marc APRN  •  risperiDONE (risperDAL) tablet 0.5 mg, 0.5 mg, Per G Tube, Nightly, Gerard Payne MD  •  saccharomyces boulardii (FLORASTOR) capsule 250 mg, 250 mg, Oral, BID, Gerard Payne MD  •  sodium chloride 0.9 % flush 10 mL, 10 mL, Intravenous, Q12H, Gerard Payne MD  •  sodium chloride 0.9 % flush 10 mL, 10 mL, Intravenous, Q12H, Gerard Payne MD  •  sodium chloride 0.9 % flush 10 mL, 10 mL, Intravenous, Q12H, Gerard Payne MD  •  sodium chloride 0.9 % flush 10 mL, 10 mL, Intravenous, PRN, Gerard Payne MD  •  sodium chloride 0.9 % flush 20 mL, 20 mL, Intravenous, PRN, Gerard Payne MD  •  sodium chloride nasal spray 1 spray, 1 spray, Each Nare, PRN, Gerard Payne MD  •  Laurie's amazing butt cream, , Topical, BID, Disha Longo APRN  •  Laurie's amazing butt cream, , Topical, PRN, Disha Longo APRN    Data:     Code Status:    There are no questions and answers to display.       No family history on file.    Social History     Socioeconomic History   • Marital status: Unknown     Spouse name: Not on file   • Number of children: Not on file   • Years of education: Not on file   • Highest education level: Not on file       Vitals:  Ht 157.5 cm (62.01\")   Wt 108 kg (238 lb 1.6 oz)   BMI 43.54 kg/m²   T 98.9  P 90 R 16 /71 Sp02 94% (room air)       I/O (24Hr):  No intake or " "output data in the 24 hours ending 09/16/20 1414    Labs and imaging:      Recent Results (from the past 12 hour(s))   POC Glucose Once    Collection Time: 09/16/20  6:01 AM    Specimen: Blood   Result Value Ref Range    Glucose 127 70 - 130 mg/dL   POC Glucose Once    Collection Time: 09/16/20 10:56 AM    Specimen: Blood   Result Value Ref Range    Glucose 183 (H) 70 - 130 mg/dL         Physical Examination:        Physical Exam   Constitutional: She appears well-developed and well-nourished.   HENT:   Head: Normocephalic and atraumatic.   Nose: Nose normal.   Eyes: Pupils are equal, round, and reactive to light. Conjunctivae and EOM are normal.   Neck: Normal range of motion. Neck supple.   Cardiovascular: Normal rate, regular rhythm, normal heart sounds and intact distal pulses.   Pulmonary/Chest: Effort normal and breath sounds normal.   Abdominal: Soft. Bowel sounds are normal. She exhibits distension.   Obese   Musculoskeletal: Edema (LUE) present.      Comments: Generalized weakness  Left hemiplegia   Neurological: She is alert.   Interacting appropriately with staff  Oriented to person and place (\"2021\")   Skin: Skin is warm and dry.   Scattered bruising to extremities   Psychiatric: She has a normal mood and affect. Her behavior is normal.   Nursing note and vitals reviewed.        Assessment:            * No active hospital problems. *    Past Medical History:   Diagnosis Date   • Anxiety    • Asthma    • Brain injury (CMS/HCC)    • Bronchitis    • Dysphagia    • GERD (gastroesophageal reflux disease)    • Hypertension    • Obesity    • Pseudobulbar affect    • Rheumatoid arthritis (CMS/HCC)         Plan:        1. Acute hypoxic/hypercapneic respiratory failure  2. DM2 with hyperglycemia  3. Dysphagia  4. HTN  5. Late effects of previous CVA  6. GERD  7. Anxiety  8. RA  9. Morbid obesity  10. S/p aspiration event  11. S/p respiratory arrest  12. Pseudobulbar affect  13. Hypernatremia  14. MRSA " sputum    Continue current treatment. Monitor counts. Increase activity as tolerated. Continue modified po diet and DC TPN.  Labs in am. Maintain patient safety.  Steroid weaning.   Watch off antibiotics. Monitor closely for respiratory distress.       Electronically signed by BEAU Sheppard on 9/16/2020 at 14:14 CDT   I have discussed the care of Makenzie Norman, including pertinent history and exam findings, with the nurse practitioner.    I have seen and examined the patient and the key elements of all parts of the encounter have been performed by me.  I agree with the assessment, plan and orders as documented by BEAU Corona, after I modified the exam findings and the plan of treatments and the final version is my approved version of the assessment.        Electronically signed by Gerard Payne MD on 9/16/2020 at 21:52 CDT

## 2020-09-17 ENCOUNTER — APPOINTMENT (OUTPATIENT)
Dept: GENERAL RADIOLOGY | Facility: HOSPITAL | Age: 60
End: 2020-09-17

## 2020-09-17 LAB
GLUCOSE BLDC GLUCOMTR-MCNC: 118 MG/DL (ref 70–130)
GLUCOSE BLDC GLUCOMTR-MCNC: 121 MG/DL (ref 70–130)
GLUCOSE BLDC GLUCOMTR-MCNC: 154 MG/DL (ref 70–130)
GLUCOSE BLDC GLUCOMTR-MCNC: 60 MG/DL (ref 70–130)
GLUCOSE BLDC GLUCOMTR-MCNC: 62 MG/DL (ref 70–130)
SARS-COV-2 RDRP RESP QL NAA+PROBE: NOT DETECTED

## 2020-09-17 PROCEDURE — 87635 SARS-COV-2 COVID-19 AMP PRB: CPT | Performed by: INTERNAL MEDICINE

## 2020-09-17 PROCEDURE — 25010000002 HEPARIN (PORCINE) PER 1000 UNITS: Performed by: INTERNAL MEDICINE

## 2020-09-17 PROCEDURE — 92611 MOTION FLUOROSCOPY/SWALLOW: CPT

## 2020-09-17 PROCEDURE — 63710000001 INSULIN DETEMIR PER 5 UNITS: Performed by: NURSE PRACTITIONER

## 2020-09-17 PROCEDURE — 25010000002 METHYLPREDNISOLONE PER 40 MG: Performed by: NURSE PRACTITIONER

## 2020-09-17 PROCEDURE — 74230 X-RAY XM SWLNG FUNCJ C+: CPT

## 2020-09-17 PROCEDURE — 92526 ORAL FUNCTION THERAPY: CPT

## 2020-09-17 PROCEDURE — 63710000001 INSULIN LISPRO (HUMAN) PER 5 UNITS: Performed by: INTERNAL MEDICINE

## 2020-09-17 PROCEDURE — 82962 GLUCOSE BLOOD TEST: CPT

## 2020-09-17 RX ORDER — PREDNISONE 20 MG/1
20 TABLET ORAL
Status: DISCONTINUED | OUTPATIENT
Start: 2020-09-17 | End: 2020-09-21

## 2020-09-17 NOTE — THERAPY DISCHARGE NOTE
Outpatient Speech Language Pathology   Adult Swallow Initial Eval/Discharge  [facility]     Patient Name: Makenzie Norman  : 1960  MRN: [id number]  Today's Date: 2020         Visit Date: 2020   There is no problem list on file for this patient.    SPEECH-LANGUAGE PATHOLOGY EVALUTION - VFSS  Subjective: The patient was seen on this date for a VFSS(Videofluoroscopic Swallowing Study).  Patient was alert and cooperative.    Significant history: Aspiration event, respiratory arrest/failure, intubation, remote hx of CVA, left hemiparesis.  Objective: Risks/benefits were reviewed with the patient, and consent was obtained. The study was completed with SLP and Radiologist present. The patient was seen in lateral view(s). Textures given included nectar thick liquid, honey thick liquid and puree consistency.  Assessment: Consistencies were presented in the following order: honey, pudding, honey, nectar, pudding, honey, nectar, honey via straw x2. Due to technical error, the first four trials were not recorded. With the first trial of honey thick the pt displayed silent laryngeal penetration and aspiration during the swallow. She had minimal residue remaining in the laryngeal vestibule and at the level of the vocal folds post-swallow. She also displayed definite deep laryngeal penetration of the first nectar thick trial. With pudding thick, the pt exhibited increased oral prep time, a delay in oral transit and premature loss to the vallecula secondary to decreased back of tongue control. She had poor hyolaryngeal elevation and excursion throughout the study, but adequate epiglottic inversion. With the last three trials of honey thick (via both cup and straw) the pt had premature loss to the vallecula secondary to decreased back of tongue control. She had premature loss of most of the bolus to the vallecula and pyriform sinuses with nectar thick barium secondary to decreased back of tongue control.   SLP  Findings: Patient presents with moderate to severe oropharyngeal dysphagia.   Recommendations: Diet Textures: pudding thick liquid, puree consistency food. Medications should be taken crushed with puree. May have Ice between meals after oral care, under staff or family supervision and with the recommended strategies for safe swallowing.  Recommended Strategies: Upright for PO, small bites and sips and alternate liquids and solids. Oral care before breakfast, after all meals and PRN.  Dysphagia therapy is recommended.   Selam Donahue CCC-SLP 9/17/2020 15:16 CDT     Past Medical History:   Diagnosis Date   • Anxiety    • Asthma    • Brain injury (CMS/HCC)    • Bronchitis    • Dysphagia    • GERD (gastroesophageal reflux disease)    • Hypertension    • Obesity    • Pseudobulbar affect    • Rheumatoid arthritis (CMS/HCC)         Past Surgical History:   Procedure Laterality Date   • PEG TUBE INSERTION     • TOE SURGERY     • TUBAL ABDOMINAL LIGATION           Visit Dx:     ICD-10-CM ICD-9-CM   1. Dysphagia, unspecified type  R13.10 787.20           SLP Adult Swallow Evaluation     Row Name 09/17/20 1352       Rehab Evaluation    Document Type  evaluation  -MB    Subjective Information  complains of;pain  -MB    Patient Observations  alert;cooperative  -MB    Patient/Family/Caregiver Comments/Observations  No family present  -MB       General Information    Patient Profile Reviewed  yes  -MB    Pertinent History Of Current Problem  Aspiration event, respiratory arrest/failure, intubation, remote hx of CVA, left hemiparesis.  -MB    Current Method of Nutrition  pureed;honey-thick liquids  -MB    Precautions/Limitations, Vision  WFL;for purposes of eval  -MB    Precautions/Limitations, Hearing  WFL;for purposes of eval  -MB    Prior Level of Function-Communication  cognitive-linguistic impairment;other (see comments)  -MB    Prior Level of Function-Swallowing  other (see comments)  -MB    Plans/Goals Discussed with   patient  -MB    Barriers to Rehab  medically complex  -MB    Patient's Goals for Discharge  patient did not state  -MB       Pain    Additional Documentation  Pain Scale: FACES Pre/Post-Treatment (Group)  -MB       Pain Scale: FACES Pre/Post-Treatment    Pain Location - Side  Left  -MB    Pain Location - Orientation  lower  -MB    Pain Location  extremity  -MB       Oral Motor and Function    Dentition Assessment  missing teeth  -MB    Secretion Management  WNL/WFL  -MB    Mucosal Quality  moist, healthy  -MB       General Eating/Swallowing Observations    Respiratory Support Currently in Use  room air  -MB       MBS/VFSS    Utensils Used  spoon;straw  -MB    Consistencies Trialed  nectar/syrup-thick liquids;honey-thick liquids;pudding thick  -MB       MBS/VFSS Interpretation    Oral Prep Phase  impaired oral phase of swallowing  -MB    Oral Transit Phase  impaired  -MB    Oral Residue  WFL  -MB    VFSS Summary  Consistencies were presented in the following order: honey, pudding, honey, nectar, pudding, honey, nectar, honey via straw x2. Due to technical error, the first four trials were not recorded. With the first trial of honey thick the pt displayed silent laryngeal penetration and aspiration during the swallow. She had minimal residue remaining in the laryngeal vestibule and at the level of the vocal folds post-swallow. She also displayed definite deep laryngeal penetration of the first nectar thick trial. With pudding thick, the pt exhibited increased oral prep time, a delay in oral transit and premature loss to the vallecula secondary to decreased back of tongue control. She had poor hyolaryngeal elevation and excursion throughout the study, but adequate epiglottic inversion. With the last three trials of honey thick (via both cup and straw) the pt had premature loss to the vallecula secondary to decreased back of tongue control. She had premature loss of most of the bolus to the vallecula and pyriform  sinuses with nectar thick barium secondary to decreased back of tongue control.   -MB       Oral Preparatory Phase    Oral Preparatory Phase  prolonged manipulation  -MB    Prolonged Manipulation  pudding/puree  -MB       Oral Transit Phase    Impaired Oral Transit Phase  delayed initiation of bolus transit  -MB    Delayed Initiation of bolus transit  pudding/puree  -MB       Initiation of Pharyngeal Swallow    Initiation of Pharyngeal Swallow  bolus in valleculae;bolus in pyriform sinuses  -MB    Pharyngeal Phase  impaired pharyngeal phase of swallowing  -MB    Penetration During the Swallow  nectar-thick liquids;honey-thick liquids  -MB    Aspiration During the Swallow  honey-thick liquids  -MB    Response to Penetration  no response  -MB    Response to Aspiration  no response, silent aspiration  -MB       Clinical Impression    SLP Swallowing Diagnosis  mod-severe;pharyngeal dysfunction;mild;oral dysfunction  -MB    Functional Impact  risk of aspiration/pneumonia;risk of malnutrition;risk of dehydration  -MB    Rehab Potential/Prognosis, Swallowing  re-evaluate goals as necessary  -MB    Swallow Criteria for Skilled Therapeutic Interventions Met  demonstrates skilled criteria  -MB       Recommendations    SLP Diet Recommendation  puree;pudding thick liquids  -MB    Recommended Precautions and Strategies  upright posture during/after eating;small bites of food and sips of liquid;alternate between small bites of food and sips of liquid  -MB    SLP Rec. for Method of Medication Administration  meds crushed;with pudding or applesauce  -MB    Monitor for Signs of Aspiration  yes;cough;gurgly voice;throat clearing;pneumonia;notify SLP if any concerns  -MB    Anticipated Dischage Disposition (SLP)  skilled nursing facility  -MB      User Key  (r) = Recorded By, (t) = Taken By, (c) = Cosigned By    Initials Name Provider Type    Selam Connell CCC-SLP Speech and Language Pathologist                        OP SLP  Education     Row Name 09/17/20 1457       Education    Barriers to Learning  Decreased comprehension  -MB    Assessed  Learning needs;Learning motivation;Learning preferences;Learning readiness  -MB    Learning Motivation  Moderate  -MB    Learning Method  Explanation  -MB    Teaching Response  Reinforcement needed  -MB      User Key  (r) = Recorded By, (t) = Taken By, (c) = Cosigned By    Initials Name Effective Dates    Selam Connell CCC-SLP 08/02/16 -                          Time Calculation:   SLP Start Time: 1352  SLP Stop Time: 1515  SLP Time Calculation (min): 83 min    Therapy Charges for Today     Code Description Service Date Service Provider Modifiers Qty    15962213507 HC ST TREATMENT SWALLOW 2 9/16/2020 Selam Donahue CCC-SLP GN 1    66094896529 HC ST TREATMENT SWALLOW 2 9/17/2020 Selam Donahue CCC-SLP GN 1    78598588089 HC ST MOTION FLUORO EVAL SWALLOW 6 9/17/2020 Selam Donahue CCC-SLP GN 1                      EBER Flower  9/17/2020

## 2020-09-17 NOTE — PROGRESS NOTES
Elmer Payne M.D.  [facility]        Internal Medicine Progress Note    9/17/2020   10:33 CDT    Name:  Makenzie Norman  MRN:    [id number]     Acct:     [id number]   Room:  [facility] Day: 0     Admit Date: 9/1/2020  2:09 PM  PCP: Provider, No Known    Subjective:     C/C: need for continued oxygen weaning and nutrition support.     Interval History: Status:improved. Resting in bed. No family at bedside. Alert and interacting with staff. Confused, but at baseline. C/o back itching. 02 sats stable on room air. Tolerating modified po diet without difficulty.  Afebrile.   Review of Systems   Constitution: Positive for malaise/fatigue. Negative for chills, decreased appetite, weight gain and weight loss.   HENT: Negative for congestion, ear discharge, hoarse voice and tinnitus.    Eyes: Negative for blurred vision, discharge, visual disturbance and visual halos.   Cardiovascular: Negative for chest pain, claudication, dyspnea on exertion, irregular heartbeat, leg swelling, orthopnea and paroxysmal nocturnal dyspnea.   Respiratory: Negative for cough, shortness of breath, sputum production and wheezing.    Endocrine: Negative for cold intolerance, heat intolerance and polyuria.   Hematologic/Lymphatic: Negative for adenopathy. Does not bruise/bleed easily.   Skin: Negative for dry skin, itching and suspicious lesions.   Musculoskeletal: Negative for arthritis, back pain, falls, joint pain, muscle weakness and myalgias.   Gastrointestinal: Negative for abdominal pain, constipation, diarrhea, dysphagia and hematemesis.   Genitourinary: Negative for bladder incontinence, dysuria and frequency.   Neurological: Negative for aphonia, disturbances in coordination, dizziness and weakness.   Psychiatric/Behavioral: Negative for altered mental status, depression, memory loss and substance abuse. The patient does not have insomnia and is not nervous/anxious.          Medications:     Allergies:    Allergies   Allergen Reactions   • Valium [Diazepam] Unknown - Low Severity   • Xanax [Alprazolam] Unknown - Low Severity   • Morphine Anxiety       Current Meds:   Current Facility-Administered Medications:   •  acetaminophen (TYLENOL) tablet 650 mg, 650 mg, Oral, Q6H PRN **OR** acetaminophen (TYLENOL) suppository 650 mg, 650 mg, Rectal, Q4H PRN, Gerard Payne MD  •  carvedilol (COREG) tablet 6.25 mg, 6.25 mg, Per G Tube, BID With Meals, Gerard Payne MD  •  dextrose (D50W) 25 g/ 50mL Intravenous Solution 25 g, 25 g, Intravenous, Q15 Min PRN, Gerard Payne MD  •  dextrose (GLUTOSE) oral gel 15 g, 15 g, Oral, Q15 Min PRN, Gerard Payne MD  •  docusate sodium (COLACE) capsule 100 mg, 100 mg, Per G Tube, BID, Gerard Payne MD  •  glucagon (human recombinant) (GLUCAGEN DIAGNOSTIC) injection 1 mg, 1 mg, Subcutaneous, Q15 Min PRN, Gerard Payne MD  •  guaiFENesin (ROBITUSSIN) 100 MG/5ML oral solution 200 mg, 200 mg, Per G Tube, 4x Daily, Gerard Payne MD  •  heparin (porcine) 5000 UNIT/ML injection 5,000 Units, 5,000 Units, Subcutaneous, Q8H, Gerard Payne MD  •  hydrALAZINE (APRESOLINE) tablet 25 mg, 25 mg, Per G Tube, Q8H, Lani Weldon, BEAU  •  insulin detemir (LEVEMIR) injection 5 Units, 5 Units, Subcutaneous, Nightly, Lani Weldon, BEAU  •  insulin lispro (humaLOG) injection 0-24 Units, 0-24 Units, Subcutaneous, 4x Daily With Meals & Nightly, Gerard Payne MD  •  ipratropium-albuterol (DUO-NEB) nebulizer solution 3 mL, 3 mL, Nebulization, Q4H - RT, Gerard Payne MD  •  labetalol (NORMODYNE,TRANDATE) injection 20 mg, 20 mg, Intravenous, Q4H PRN, Gerard Payne MD  •  lactulose solution 20 g, 20 g, Per G Tube, Once, Lani Weldon, BEAU  •  lactulose solution 20 g, 20 g, Per G Tube, Daily PRN, Lani Weldon APRN  •  lansoprazole (FIRST) oral suspension 30 mg, 30 mg, Per G Tube, Daily,  "Lani Weldon APRN  •  methylPREDNISolone sodium succinate (SOLU-Medrol) injection 40 mg, 40 mg, Intravenous, Daily, Lani Weldon APRN  •  ondansetron (ZOFRAN) injection 4 mg, 4 mg, Intravenous, Q6H PRN, Gerard Payne MD  •  oxybutynin (DITROPAN) tablet 5 mg, 5 mg, Per G Tube, TID, Gerard Payne MD  •  polyethylene glycol (MIRALAX) packet 17 g, 17 g, Per G Tube, Daily, Gerard Payne MD  •  racemic epinephrine (RACEPINEPHRINE) nebulizer solution 0.5 mL, 0.5 mL, Nebulization, Q4H PRN, Marylu Marc APRN  •  risperiDONE (risperDAL) tablet 0.5 mg, 0.5 mg, Per G Tube, Nightly, Gerard Payne MD  •  saccharomyces boulardii (FLORASTOR) capsule 250 mg, 250 mg, Oral, BID, Gerard Payne MD  •  sodium chloride nasal spray 1 spray, 1 spray, Each Nare, PRN, Gerard Payne MD  •  Laurie's amazing butt cream, , Topical, BID, Disha Longo APRN  •  Laurie's amazing butt cream, , Topical, PRN, Disha Longo APRN    Data:     Code Status:    There are no questions and answers to display.       No family history on file.    Social History     Socioeconomic History   • Marital status: Unknown     Spouse name: Not on file   • Number of children: Not on file   • Years of education: Not on file   • Highest education level: Not on file       Vitals:  Ht 157.5 cm (62.01\")   Wt 108 kg (238 lb 1.6 oz)   BMI 43.54 kg/m²   T 98.3  P 91 R 16 /68 Sp02 92% (room air)       I/O (24Hr):  No intake or output data in the 24 hours ending 09/17/20 1033    Labs and imaging:      Recent Results (from the past 12 hour(s))   POC Glucose Once    Collection Time: 09/17/20  9:58 AM    Specimen: Blood   Result Value Ref Range    Glucose 121 70 - 130 mg/dL         Physical Examination:        Physical Exam   Constitutional: She appears well-developed and well-nourished.   HENT:   Head: Normocephalic and atraumatic.   Nose: Nose normal.   Eyes: Pupils are equal, " "round, and reactive to light. Conjunctivae and EOM are normal.   Neck: Normal range of motion. Neck supple.   Cardiovascular: Normal rate, regular rhythm, normal heart sounds and intact distal pulses.   Pulmonary/Chest: Effort normal and breath sounds normal.   Abdominal: Soft. Bowel sounds are normal. She exhibits distension.   Obese   Musculoskeletal: Edema (LUE) present.      Comments: Generalized weakness  Left hemiplegia   Neurological: She is alert.   Interacting appropriately with staff  Oriented to person and place (\"2021\")   Skin: Skin is warm and dry.   Scattered bruising to extremities   Psychiatric: She has a normal mood and affect. Her behavior is normal.   Nursing note and vitals reviewed.        Assessment:            * No active hospital problems. *    Past Medical History:   Diagnosis Date   • Anxiety    • Asthma    • Brain injury (CMS/HCC)    • Bronchitis    • Dysphagia    • GERD (gastroesophageal reflux disease)    • Hypertension    • Obesity    • Pseudobulbar affect    • Rheumatoid arthritis (CMS/HCC)         Plan:        1. Acute hypoxic/hypercapneic respiratory failure  2. DM2 with hyperglycemia  3. Dysphagia  4. HTN  5. Late effects of previous CVA  6. GERD  7. Anxiety  8. RA  9. Morbid obesity  10. S/p aspiration event  11. S/p respiratory arrest  12. Pseudobulbar affect  13. Hypernatremia  14. MRSA sputum    Continue current treatment. Monitor counts. Increase activity as tolerated. Continue modified po diet and encourage increased po intake.  Labs in am. Maintain patient safety.  Steroid weaning.  Watch off antibiotics. Monitor closely for respiratory distress.       Electronically signed by BEAU Sheppard on 9/17/2020 at 10:33 CDT     "

## 2020-09-18 LAB
ANION GAP SERPL CALCULATED.3IONS-SCNC: 16 MMOL/L (ref 5–15)
BASOPHILS # BLD AUTO: 0.02 10*3/MM3 (ref 0–0.2)
BASOPHILS # BLD MANUAL: 0.07 10*3/MM3 (ref 0–0.2)
BASOPHILS NFR BLD AUTO: 0.2 % (ref 0–1.5)
BASOPHILS NFR BLD AUTO: 0.8 % (ref 0–1.5)
BUN SERPL-MCNC: 13 MG/DL (ref 6–20)
BUN/CREAT SERPL: 56.5 (ref 7–25)
CALCIUM SPEC-SCNC: 8.6 MG/DL (ref 8.6–10.5)
CHLORIDE SERPL-SCNC: 101 MMOL/L (ref 98–107)
CO2 SERPL-SCNC: 24 MMOL/L (ref 22–29)
CREAT SERPL-MCNC: 0.23 MG/DL (ref 0.57–1)
DEPRECATED RDW RBC AUTO: 44.5 FL (ref 37–54)
EOSINOPHIL # BLD AUTO: 0.24 10*3/MM3 (ref 0–0.4)
EOSINOPHIL # BLD MANUAL: 0.07 10*3/MM3 (ref 0–0.4)
EOSINOPHIL NFR BLD AUTO: 2.6 % (ref 0.3–6.2)
EOSINOPHIL NFR BLD MANUAL: 0.8 % (ref 0.3–6.2)
ERYTHROCYTE [DISTWIDTH] IN BLOOD BY AUTOMATED COUNT: 13.6 % (ref 12.3–15.4)
GFR SERPL CREATININE-BSD FRML MDRD: >150 ML/MIN/1.73
GLUCOSE BLDC GLUCOMTR-MCNC: 110 MG/DL (ref 70–130)
GLUCOSE BLDC GLUCOMTR-MCNC: 110 MG/DL (ref 70–130)
GLUCOSE BLDC GLUCOMTR-MCNC: 141 MG/DL (ref 70–130)
GLUCOSE BLDC GLUCOMTR-MCNC: 152 MG/DL (ref 70–130)
GLUCOSE SERPL-MCNC: 86 MG/DL (ref 65–99)
HCT VFR BLD AUTO: 33.5 % (ref 34–46.6)
HGB BLD-MCNC: 11.1 G/DL (ref 12–15.9)
LYMPHOCYTES # BLD AUTO: 1.49 10*3/MM3 (ref 0.7–3.1)
LYMPHOCYTES # BLD MANUAL: 1.14 10*3/MM3 (ref 0.7–3.1)
LYMPHOCYTES NFR BLD AUTO: 16 % (ref 19.6–45.3)
LYMPHOCYTES NFR BLD MANUAL: 12.3 % (ref 19.6–45.3)
LYMPHOCYTES NFR BLD MANUAL: 3.3 % (ref 5–12)
MAGNESIUM SERPL-MCNC: 1.8 MG/DL (ref 1.6–2.6)
MCH RBC QN AUTO: 30 PG (ref 26.6–33)
MCHC RBC AUTO-ENTMCNC: 33.1 G/DL (ref 31.5–35.7)
MCV RBC AUTO: 90.5 FL (ref 79–97)
MONOCYTES # BLD AUTO: 0.31 10*3/MM3 (ref 0.1–0.9)
MONOCYTES # BLD AUTO: 0.52 10*3/MM3 (ref 0.1–0.9)
MONOCYTES NFR BLD AUTO: 5.6 % (ref 5–12)
NEUTROPHILS # BLD AUTO: 7.54 10*3/MM3 (ref 1.7–7)
NEUTROPHILS NFR BLD AUTO: 6.95 10*3/MM3 (ref 1.7–7)
NEUTROPHILS NFR BLD AUTO: 74.7 % (ref 42.7–76)
NEUTROPHILS NFR BLD MANUAL: 81.1 % (ref 42.7–76)
NT-PROBNP SERPL-MCNC: 451.4 PG/ML (ref 0–900)
PHOSPHATE SERPL-MCNC: 3 MG/DL (ref 2.5–4.5)
PLAT MORPH BLD: NORMAL
PLATELET # BLD AUTO: 98 10*3/MM3 (ref 140–450)
PMV BLD AUTO: 12.7 FL (ref 6–12)
POLYCHROMASIA BLD QL SMEAR: ABNORMAL
POTASSIUM SERPL-SCNC: 3.3 MMOL/L (ref 3.5–5.2)
RBC # BLD AUTO: 3.7 10*6/MM3 (ref 3.77–5.28)
SODIUM SERPL-SCNC: 141 MMOL/L (ref 136–145)
VARIANT LYMPHS NFR BLD MANUAL: 1.6 % (ref 0–5)
WBC # BLD AUTO: 9.3 10*3/MM3 (ref 3.4–10.8)
WBC MORPH BLD: NORMAL

## 2020-09-18 PROCEDURE — 80048 BASIC METABOLIC PNL TOTAL CA: CPT | Performed by: INTERNAL MEDICINE

## 2020-09-18 PROCEDURE — 63710000001 INSULIN DETEMIR PER 5 UNITS: Performed by: NURSE PRACTITIONER

## 2020-09-18 PROCEDURE — 82962 GLUCOSE BLOOD TEST: CPT

## 2020-09-18 PROCEDURE — 85025 COMPLETE CBC W/AUTO DIFF WBC: CPT | Performed by: INTERNAL MEDICINE

## 2020-09-18 PROCEDURE — 63710000001 PREDNISONE PER 1 MG: Performed by: NURSE PRACTITIONER

## 2020-09-18 PROCEDURE — 83880 ASSAY OF NATRIURETIC PEPTIDE: CPT | Performed by: INTERNAL MEDICINE

## 2020-09-18 PROCEDURE — 83735 ASSAY OF MAGNESIUM: CPT | Performed by: INTERNAL MEDICINE

## 2020-09-18 PROCEDURE — 84100 ASSAY OF PHOSPHORUS: CPT | Performed by: INTERNAL MEDICINE

## 2020-09-18 PROCEDURE — 85007 BL SMEAR W/DIFF WBC COUNT: CPT | Performed by: INTERNAL MEDICINE

## 2020-09-18 PROCEDURE — 25010000002 HEPARIN (PORCINE) PER 1000 UNITS: Performed by: INTERNAL MEDICINE

## 2020-09-18 PROCEDURE — 97168 OT RE-EVAL EST PLAN CARE: CPT | Performed by: OCCUPATIONAL THERAPIST

## 2020-09-18 RX ORDER — IPRATROPIUM BROMIDE AND ALBUTEROL SULFATE 2.5; .5 MG/3ML; MG/3ML
3 SOLUTION RESPIRATORY (INHALATION) EVERY 4 HOURS PRN
Status: DISCONTINUED | OUTPATIENT
Start: 2020-09-18 | End: 2020-09-22 | Stop reason: HOSPADM

## 2020-09-18 RX ORDER — IPRATROPIUM BROMIDE AND ALBUTEROL SULFATE 2.5; .5 MG/3ML; MG/3ML
3 SOLUTION RESPIRATORY (INHALATION)
Status: DISCONTINUED | OUTPATIENT
Start: 2020-09-18 | End: 2020-09-19

## 2020-09-18 NOTE — PROGRESS NOTES
Elmer Payne M.D.  BEAU Corona        Internal Medicine Progress Note    9/18/2020   13:51 CDT    Name:  Makenzie Norman  MRN:    [id number]     Acct:     [id number]   Room:  [facility] Day: 0     Admit Date: 9/1/2020  2:09 PM  PCP: Provider, No Known    Subjective:     C/C: need for continued oxygen weaning and nutrition support.     Interval History: Status:improved. Resting in bed. No family at bedside. Alert and interacting with staff. Confused, but at baseline. C/o back itching. 02 sats stable on room air. Tolerating modified po diet without difficulty.  Afebrile. Platelet count down to 98.  Review of Systems   Constitution: Positive for malaise/fatigue. Negative for chills, decreased appetite, weight gain and weight loss.   HENT: Negative for congestion, ear discharge, hoarse voice and tinnitus.    Eyes: Negative for blurred vision, discharge, visual disturbance and visual halos.   Cardiovascular: Negative for chest pain, claudication, dyspnea on exertion, irregular heartbeat, leg swelling, orthopnea and paroxysmal nocturnal dyspnea.   Respiratory: Negative for cough, shortness of breath, sputum production and wheezing.    Endocrine: Negative for cold intolerance, heat intolerance and polyuria.   Hematologic/Lymphatic: Negative for adenopathy. Does not bruise/bleed easily.   Skin: Negative for dry skin, itching and suspicious lesions.   Musculoskeletal: Negative for arthritis, back pain, falls, joint pain, muscle weakness and myalgias.   Gastrointestinal: Negative for abdominal pain, constipation, diarrhea, dysphagia and hematemesis.   Genitourinary: Negative for bladder incontinence, dysuria and frequency.   Neurological: Negative for aphonia, disturbances in coordination, dizziness and weakness.   Psychiatric/Behavioral: Negative for altered mental status, depression, memory loss and substance abuse. The patient does not have insomnia and is not nervous/anxious.           Medications:     Allergies:   Allergies   Allergen Reactions   • Valium [Diazepam] Unknown - Low Severity   • Xanax [Alprazolam] Unknown - Low Severity   • Morphine Anxiety       Current Meds:   Current Facility-Administered Medications:   •  acetaminophen (TYLENOL) tablet 650 mg, 650 mg, Oral, Q6H PRN **OR** acetaminophen (TYLENOL) suppository 650 mg, 650 mg, Rectal, Q4H PRN, Gerard Payne MD  •  carvedilol (COREG) tablet 6.25 mg, 6.25 mg, Per G Tube, BID With Meals, Gerard Payne MD  •  dextrose (D50W) 25 g/ 50mL Intravenous Solution 25 g, 25 g, Intravenous, Q15 Min PRN, Gerard Payne MD  •  dextrose (GLUTOSE) oral gel 15 g, 15 g, Oral, Q15 Min PRN, Gerard Payne MD  •  docusate sodium (COLACE) capsule 100 mg, 100 mg, Per G Tube, BID, Gerard Payne MD  •  glucagon (human recombinant) (GLUCAGEN DIAGNOSTIC) injection 1 mg, 1 mg, Subcutaneous, Q15 Min PRN, Gerard Payne MD  •  guaiFENesin (ROBITUSSIN) 100 MG/5ML oral solution 200 mg, 200 mg, Per G Tube, 4x Daily, Gerard Payne MD  •  heparin (porcine) 5000 UNIT/ML injection 5,000 Units, 5,000 Units, Subcutaneous, Q8H, Gerard Payne MD  •  hydrALAZINE (APRESOLINE) tablet 25 mg, 25 mg, Per G Tube, Q8H, Lani Weldon APRN  •  insulin detemir (LEVEMIR) injection 5 Units, 5 Units, Subcutaneous, Nightly, Lani Weldon APRN  •  insulin lispro (humaLOG) injection 0-24 Units, 0-24 Units, Subcutaneous, 4x Daily With Meals & Nightly, Gerard Payen MD  •  ipratropium-albuterol (DUO-NEB) nebulizer solution 3 mL, 3 mL, Nebulization, 4x Daily - RT, Gerard Payne MD  •  ipratropium-albuterol (DUO-NEB) nebulizer solution 3 mL, 3 mL, Nebulization, Q4H PRN, Gerard Payne MD  •  labetalol (NORMODYNE,TRANDATE) injection 20 mg, 20 mg, Intravenous, Q4H PRN, Gerard Payne MD  •  lactulose solution 20 g, 20 g, Per G Tube, Once, Lani Weldon, APRN  •   "lactulose solution 20 g, 20 g, Per G Tube, Daily PRN, Lani Weldon APRN  •  lansoprazole (FIRST) oral suspension 30 mg, 30 mg, Per G Tube, Daily, Lani Weldon APRN  •  ondansetron (ZOFRAN) injection 4 mg, 4 mg, Intravenous, Q6H PRN, Gerard Payne MD  •  oxybutynin (DITROPAN) tablet 5 mg, 5 mg, Per G Tube, TID, Gerard Payne MD  •  polyethylene glycol (MIRALAX) packet 17 g, 17 g, Per G Tube, Daily, Gerard Payne MD  •  predniSONE (DELTASONE) tablet 20 mg, 20 mg, Oral, Daily With Breakfast, Lani Weldon APRN  •  racemic epinephrine (RACEPINEPHRINE) nebulizer solution 0.5 mL, 0.5 mL, Nebulization, Q4H PRN, Marylu Marc APRN  •  risperiDONE (risperDAL) tablet 0.5 mg, 0.5 mg, Per G Tube, Nightly, Gerard Payen MD  •  saccharomyces boulardii (FLORASTOR) capsule 250 mg, 250 mg, Oral, BID, Gerard Payne MD  •  sodium chloride nasal spray 1 spray, 1 spray, Each Nare, PRN, Gerard Payne MD  •  Laurie's amazing butt cream, , Topical, BID, Disha Longo APRN  •  Laurie's amazing butt cream, , Topical, PRN, Disha Longo APRN    Data:     Code Status:    There are no questions and answers to display.       History reviewed. No pertinent family history.    Social History     Socioeconomic History   • Marital status: Unknown     Spouse name: Not on file   • Number of children: Not on file   • Years of education: Not on file   • Highest education level: Not on file       Vitals:  Ht 157.5 cm (62.01\")   Wt 108 kg (238 lb 1.6 oz)   BMI 43.54 kg/m²   T 98.5  P 114 R 22 /89 Sp02 93% (room air)       I/O (24Hr):  No intake or output data in the 24 hours ending 09/18/20 1351    Labs and imaging:      Recent Results (from the past 12 hour(s))   Magnesium    Collection Time: 09/18/20  3:32 AM    Specimen: Blood   Result Value Ref Range    Magnesium 1.8 1.6 - 2.6 mg/dL   Phosphorus    Collection Time: 09/18/20  3:32 AM    " Specimen: Blood   Result Value Ref Range    Phosphorus 3.0 2.5 - 4.5 mg/dL   Basic Metabolic Panel    Collection Time: 09/18/20  3:32 AM    Specimen: Blood   Result Value Ref Range    Glucose 86 65 - 99 mg/dL    BUN 13 6 - 20 mg/dL    Creatinine 0.23 (L) 0.57 - 1.00 mg/dL    Sodium 141 136 - 145 mmol/L    Potassium 3.3 (L) 3.5 - 5.2 mmol/L    Chloride 101 98 - 107 mmol/L    CO2 24.0 22.0 - 29.0 mmol/L    Calcium 8.6 8.6 - 10.5 mg/dL    eGFR Non African Amer >150 >60 mL/min/1.73    BUN/Creatinine Ratio 56.5 (H) 7.0 - 25.0    Anion Gap 16.0 (H) 5.0 - 15.0 mmol/L   BNP    Collection Time: 09/18/20  3:32 AM    Specimen: Blood   Result Value Ref Range    proBNP 451.4 0.0 - 900.0 pg/mL   CBC Auto Differential    Collection Time: 09/18/20  3:32 AM    Specimen: Blood   Result Value Ref Range    WBC 9.30 3.40 - 10.80 10*3/mm3    RBC 3.70 (L) 3.77 - 5.28 10*6/mm3    Hemoglobin 11.1 (L) 12.0 - 15.9 g/dL    Hematocrit 33.5 (L) 34.0 - 46.6 %    MCV 90.5 79.0 - 97.0 fL    MCH 30.0 26.6 - 33.0 pg    MCHC 33.1 31.5 - 35.7 g/dL    RDW 13.6 12.3 - 15.4 %    RDW-SD 44.5 37.0 - 54.0 fl    MPV 12.7 (H) 6.0 - 12.0 fL    Platelets 98 (L) 140 - 450 10*3/mm3    Neutrophil % 74.7 42.7 - 76.0 %    Lymphocyte % 16.0 (L) 19.6 - 45.3 %    Monocyte % 5.6 5.0 - 12.0 %    Eosinophil % 2.6 0.3 - 6.2 %    Basophil % 0.2 0.0 - 1.5 %    Neutrophils, Absolute 6.95 1.70 - 7.00 10*3/mm3    Lymphocytes, Absolute 1.49 0.70 - 3.10 10*3/mm3    Monocytes, Absolute 0.52 0.10 - 0.90 10*3/mm3    Eosinophils, Absolute 0.24 0.00 - 0.40 10*3/mm3    Basophils, Absolute 0.02 0.00 - 0.20 10*3/mm3   Manual Differential    Collection Time: 09/18/20  3:32 AM    Specimen: Blood   Result Value Ref Range    Neutrophil % 81.1 (H) 42.7 - 76.0 %    Lymphocyte % 12.3 (L) 19.6 - 45.3 %    Monocyte % 3.3 (L) 5.0 - 12.0 %    Eosinophil % 0.8 0.3 - 6.2 %    Basophil % 0.8 0.0 - 1.5 %    Atypical Lymphocyte % 1.6 0.0 - 5.0 %    Neutrophils Absolute 7.54 (H) 1.70 - 7.00 10*3/mm3     "Lymphocytes Absolute 1.14 0.70 - 3.10 10*3/mm3    Monocytes Absolute 0.31 0.10 - 0.90 10*3/mm3    Eosinophils Absolute 0.07 0.00 - 0.40 10*3/mm3    Basophils Absolute 0.07 0.00 - 0.20 10*3/mm3    Polychromasia Slight/1+ None Seen    WBC Morphology Normal Normal    Platelet Morphology Normal Normal   POC Glucose Once    Collection Time: 09/18/20  7:44 AM    Specimen: Blood   Result Value Ref Range    Glucose 110 70 - 130 mg/dL   POC Glucose Once    Collection Time: 09/18/20  1:08 PM    Specimen: Blood   Result Value Ref Range    Glucose 152 (H) 70 - 130 mg/dL         Physical Examination:        Physical Exam   Constitutional: She appears well-developed and well-nourished.   HENT:   Head: Normocephalic and atraumatic.   Nose: Nose normal.   Eyes: Pupils are equal, round, and reactive to light. Conjunctivae and EOM are normal.   Neck: Normal range of motion. Neck supple.   Cardiovascular: Normal rate, regular rhythm, normal heart sounds and intact distal pulses.   Pulmonary/Chest: Effort normal and breath sounds normal.   Abdominal: Soft. Bowel sounds are normal. She exhibits distension.   Obese   Musculoskeletal: Edema (LUE) present.      Comments: Generalized weakness  Left hemiplegia   Neurological: She is alert.   Interacting appropriately with staff  Oriented to person and place (\"2021\")   Skin: Skin is warm and dry.   Scattered bruising to extremities   Psychiatric: She has a normal mood and affect. Her behavior is normal.   Nursing note and vitals reviewed.        Assessment:            * No active hospital problems. *    Past Medical History:   Diagnosis Date   • Anxiety    • Asthma    • Brain injury (CMS/HCC)    • Bronchitis    • Dysphagia    • GERD (gastroesophageal reflux disease)    • Hypertension    • Obesity    • Pseudobulbar affect    • Rheumatoid arthritis (CMS/HCC)         Plan:        1. Acute hypoxic/hypercapneic respiratory failure  2. DM2 with hyperglycemia  3. Dysphagia  4. HTN  5. Late effects " of previous CVA  6. GERD  7. Anxiety  8. RA  9. Morbid obesity  10. S/p aspiration event  11. S/p respiratory arrest  12. Pseudobulbar affect  13. Hypernatremia  14. MRSA sputum    Continue current treatment. Monitor counts. Increase activity as tolerated. Continue modified po diet and encourage increased po intake.  Labs Monday. Maintain patient safety.  Steroid weaning.  Watch off antibiotics. Monitor closely for respiratory distress. Monitor platelet count closely.       Electronically signed by BEAU Sheppard on 9/18/2020 at 13:51 CDT   I have discussed the care of Makenzie Norman, including pertinent history and exam findings, with the nurse practitioner.    I have seen and examined the patient and the key elements of all parts of the encounter have been performed by me.  I agree with the assessment, plan and orders as documented by BEAU Corona, after I modified the exam findings and the plan of treatments and the final version is my approved version of the assessment.        Electronically signed by Gerard Payne MD on 9/18/2020 at 21:09 CDT

## 2020-09-19 LAB
GLUCOSE BLDC GLUCOMTR-MCNC: 102 MG/DL (ref 70–130)
GLUCOSE BLDC GLUCOMTR-MCNC: 129 MG/DL (ref 70–130)
GLUCOSE BLDC GLUCOMTR-MCNC: 224 MG/DL (ref 70–130)
GLUCOSE BLDC GLUCOMTR-MCNC: 99 MG/DL (ref 70–130)

## 2020-09-19 PROCEDURE — 63710000001 INSULIN LISPRO (HUMAN) PER 5 UNITS: Performed by: INTERNAL MEDICINE

## 2020-09-19 PROCEDURE — 63710000001 INSULIN DETEMIR PER 5 UNITS: Performed by: NURSE PRACTITIONER

## 2020-09-19 PROCEDURE — 63710000001 PREDNISONE PER 1 MG: Performed by: NURSE PRACTITIONER

## 2020-09-19 PROCEDURE — 82962 GLUCOSE BLOOD TEST: CPT

## 2020-09-19 PROCEDURE — 25010000002 HEPARIN (PORCINE) PER 1000 UNITS: Performed by: INTERNAL MEDICINE

## 2020-09-19 RX ORDER — IPRATROPIUM BROMIDE AND ALBUTEROL SULFATE 2.5; .5 MG/3ML; MG/3ML
3 SOLUTION RESPIRATORY (INHALATION) EVERY 4 HOURS PRN
Status: DISCONTINUED | OUTPATIENT
Start: 2020-09-19 | End: 2020-09-19 | Stop reason: SDUPTHER

## 2020-09-20 LAB
GLUCOSE BLDC GLUCOMTR-MCNC: 131 MG/DL (ref 70–130)
GLUCOSE BLDC GLUCOMTR-MCNC: 137 MG/DL (ref 70–130)
GLUCOSE BLDC GLUCOMTR-MCNC: 174 MG/DL (ref 70–130)
GLUCOSE BLDC GLUCOMTR-MCNC: 95 MG/DL (ref 70–130)
MAGNESIUM SERPL-MCNC: 1.8 MG/DL (ref 1.6–2.6)
PHOSPHATE SERPL-MCNC: 3.1 MG/DL (ref 2.5–4.5)
WHOLE BLOOD HOLD SPECIMEN: NORMAL

## 2020-09-20 PROCEDURE — 84100 ASSAY OF PHOSPHORUS: CPT | Performed by: INTERNAL MEDICINE

## 2020-09-20 PROCEDURE — 63710000001 INSULIN LISPRO (HUMAN) PER 5 UNITS: Performed by: INTERNAL MEDICINE

## 2020-09-20 PROCEDURE — 83735 ASSAY OF MAGNESIUM: CPT | Performed by: INTERNAL MEDICINE

## 2020-09-20 PROCEDURE — 82962 GLUCOSE BLOOD TEST: CPT

## 2020-09-20 PROCEDURE — 25010000002 HEPARIN (PORCINE) PER 1000 UNITS: Performed by: INTERNAL MEDICINE

## 2020-09-20 PROCEDURE — 63710000001 PREDNISONE PER 1 MG: Performed by: NURSE PRACTITIONER

## 2020-09-20 PROCEDURE — 63710000001 INSULIN DETEMIR PER 5 UNITS: Performed by: NURSE PRACTITIONER

## 2020-09-20 NOTE — PROGRESS NOTES
HELENA Corbin APRN        Internal Medicine Progress Note    9/19/2020   21:26 CDT    Name:  Makenzie Norman  MRN:    [id number]     Acct:     [id number]   Room:  [address] [place] [address] Day: 0     Admit Date: 9/1/2020  2:09 PM  PCP: Provider, No Known    Subjective:     C/C: need for continued oxygen weaning and nutrition support.     Interval History: Status:improved. Resting in bed. No family at bedside. Alert and interacting with staff. Confused, but at baseline.  Tolerating modified po diet without difficulty.  Afebrile.     Review of Systems   Constitution: Positive for malaise/fatigue. Negative for chills, decreased appetite, weight gain and weight loss.   HENT: Negative for congestion, ear discharge, hoarse voice and tinnitus.    Eyes: Negative for blurred vision, discharge, visual disturbance and visual halos.   Cardiovascular: Negative for chest pain, claudication, dyspnea on exertion, irregular heartbeat, leg swelling, orthopnea and paroxysmal nocturnal dyspnea.   Respiratory: Negative for cough, shortness of breath, sputum production and wheezing.    Endocrine: Negative for cold intolerance, heat intolerance and polyuria.   Hematologic/Lymphatic: Negative for adenopathy. Does not bruise/bleed easily.   Skin: Negative for dry skin, itching and suspicious lesions.   Musculoskeletal: Negative for arthritis, back pain, falls, joint pain, muscle weakness and myalgias.   Gastrointestinal: Negative for abdominal pain, constipation, diarrhea, dysphagia and hematemesis.   Genitourinary: Negative for bladder incontinence, dysuria and frequency.   Neurological: Negative for aphonia, disturbances in coordination, dizziness and weakness.   Psychiatric/Behavioral: Negative for altered mental status, depression, memory loss and substance abuse. The patient does not have insomnia and is not nervous/anxious.          Medications:     Allergies:   Allergies   Allergen Reactions   • Valium  [Diazepam] Unknown - Low Severity   • Xanax [Alprazolam] Unknown - Low Severity   • Morphine Anxiety       Current Meds:   Current Facility-Administered Medications:   •  acetaminophen (TYLENOL) tablet 650 mg, 650 mg, Oral, Q6H PRN **OR** acetaminophen (TYLENOL) suppository 650 mg, 650 mg, Rectal, Q4H PRN, Gerard Payne MD  •  carvedilol (COREG) tablet 6.25 mg, 6.25 mg, Per G Tube, BID With Meals, Gerard Payne MD  •  dextrose (D50W) 25 g/ 50mL Intravenous Solution 25 g, 25 g, Intravenous, Q15 Min PRN, Gerard Payne MD  •  dextrose (GLUTOSE) oral gel 15 g, 15 g, Oral, Q15 Min PRN, Gerard Payne MD  •  docusate sodium (COLACE) capsule 100 mg, 100 mg, Per G Tube, BID, Gerard Payne MD  •  glucagon (human recombinant) (GLUCAGEN DIAGNOSTIC) injection 1 mg, 1 mg, Subcutaneous, Q15 Min PRN, Gerard Payne MD  •  guaiFENesin (ROBITUSSIN) 100 MG/5ML oral solution 200 mg, 200 mg, Per G Tube, 4x Daily, Gerard Payne MD  •  heparin (porcine) 5000 UNIT/ML injection 5,000 Units, 5,000 Units, Subcutaneous, Q8H, Gerard Payne MD  •  hydrALAZINE (APRESOLINE) tablet 25 mg, 25 mg, Per G Tube, Q8H, Lani Weldon APRN  •  insulin detemir (LEVEMIR) injection 5 Units, 5 Units, Subcutaneous, Nightly, Lani Weldon APRN  •  insulin lispro (humaLOG) injection 0-24 Units, 0-24 Units, Subcutaneous, 4x Daily With Meals & Nightly, Gerard Payne MD  •  ipratropium-albuterol (DUO-NEB) nebulizer solution 3 mL, 3 mL, Nebulization, Q4H PRN, Gerard Payne MD  •  labetalol (NORMODYNE,TRANDATE) injection 20 mg, 20 mg, Intravenous, Q4H PRN, Gerard Payne MD  •  lactulose solution 20 g, 20 g, Per G Tube, Once, Lani Weldon APRN  •  lactulose solution 20 g, 20 g, Per G Tube, Daily PRN, Lani Weldon APRN  •  lansoprazole (FIRST) oral suspension 30 mg, 30 mg, Per G Tube, Daily, Lani Weldon APRN  •  ondansetron  "(ZOFRAN) injection 4 mg, 4 mg, Intravenous, Q6H PRN, Gerard Payne MD  •  oxybutynin (DITROPAN) tablet 5 mg, 5 mg, Per G Tube, TID, Gerard Payne MD  •  polyethylene glycol (MIRALAX) packet 17 g, 17 g, Per G Tube, Daily, Gerard Payne MD  •  predniSONE (DELTASONE) tablet 20 mg, 20 mg, Oral, Daily With Breakfast, Lani Weldon APRN  •  racemic epinephrine (RACEPINEPHRINE) nebulizer solution 0.5 mL, 0.5 mL, Nebulization, Q4H PRN, Marylu Marc APRN  •  risperiDONE (risperDAL) tablet 0.5 mg, 0.5 mg, Per G Tube, Nightly, Gerard Payne MD  •  saccharomyces boulardii (FLORASTOR) capsule 250 mg, 250 mg, Oral, BID, Gerard Payne MD  •  sodium chloride nasal spray 1 spray, 1 spray, Each Nare, PRN, Gerard Payne MD  •  Laurie's amazing butt cream, , Topical, BID, Disha Longo APRN  •  Laurie's amazing butt cream, , Topical, PRN, Disha Longo APRN    Data:     Code Status:    There are no questions and answers to display.       History reviewed. No pertinent family history.    Social History     Socioeconomic History   • Marital status: Unknown     Spouse name: Not on file   • Number of children: Not on file   • Years of education: Not on file   • Highest education level: Not on file       Vitals:  Ht 157.5 cm (62.01\")   Wt 108 kg (238 lb 1.6 oz)   BMI 43.54 kg/m²   T 97.0 P 99 R 15 /84 Sp02 98% (room air)       I/O (24Hr):  No intake or output data in the 24 hours ending 09/19/20 2126    Labs and imaging:      Recent Results (from the past 12 hour(s))   POC Glucose Once    Collection Time: 09/19/20 11:33 AM    Specimen: Blood   Result Value Ref Range    Glucose 224 (H) 70 - 130 mg/dL   POC Glucose Once    Collection Time: 09/19/20  5:01 PM    Specimen: Blood   Result Value Ref Range    Glucose 99 70 - 130 mg/dL   POC Glucose Once    Collection Time: 09/19/20  9:11 PM    Specimen: Blood   Result Value Ref Range    Glucose 102 70 - " "130 mg/dL         Physical Examination:        Physical Exam   Constitutional: She appears well-developed and well-nourished.   HENT:   Head: Normocephalic and atraumatic.   Nose: Nose normal.   Eyes: Pupils are equal, round, and reactive to light. Conjunctivae and EOM are normal.   Neck: Normal range of motion. Neck supple.   Cardiovascular: Normal rate, regular rhythm, normal heart sounds and intact distal pulses.   Pulmonary/Chest: Effort normal and breath sounds normal.   Abdominal: Soft. Bowel sounds are normal. She exhibits distension.   Obese   Musculoskeletal: Edema (LUE) present.      Comments: Generalized weakness  Left hemiplegia   Neurological: She is alert.   Interacting appropriately with staff  Oriented to person and place (\"2021\")   Skin: Skin is warm and dry.   Scattered bruising to extremities   Psychiatric: She has a normal mood and affect. Her behavior is normal.   Nursing note and vitals reviewed.        Assessment:            * No active hospital problems. *    Past Medical History:   Diagnosis Date   • Anxiety    • Asthma    • Brain injury (CMS/HCC)    • Bronchitis    • Dysphagia    • GERD (gastroesophageal reflux disease)    • Hypertension    • Obesity    • Pseudobulbar affect    • Rheumatoid arthritis (CMS/HCC)         Plan:        1. Acute hypoxic/hypercapneic respiratory failure  2. DM2 with hyperglycemia  3. Dysphagia  4. HTN  5. Late effects of previous CVA  6. GERD  7. Anxiety  8. RA  9. Morbid obesity  10. S/p aspiration event  11. S/p respiratory arrest  12. Pseudobulbar affect  13. Hypernatremia  14. MRSA sputum    Continue current treatment. Monitor counts. Increase activity as tolerated. Continue modified po diet and encourage increased po intake.  Labs Monday. Maintain patient safety.  Steroid weaning.  Watch off antibiotics. Monitor closely for respiratory distress. Monitor platelet count closely.       Electronically signed by BEAU Pillai on 9/19/2020 at 21:26 CDT "

## 2020-09-20 NOTE — PROGRESS NOTES
Elmer Payne M.D.  BEAU Corona        Internal Medicine Progress Note    9/20/2020   09:20 CDT    Name:  Makenzie Norman  MRN:    [id number]     Acct:     [id number]   Room:  [address] [place] [address] Day: 0     Admit Date: 9/1/2020  2:09 PM  PCP: Provider, No Known    Subjective:     C/C: need for continued oxygen weaning and nutrition support.     Interval History: Status:improved. Resting in bed. No family at bedside. Alert and interacting with staff. Confused, but at baseline.  Tolerating modified po diet without difficulty. BS . Magnesium 1.8 and phosphorus 3.1.  Afebrile. No new concerns this morning.     Review of Systems   Constitution: Positive for malaise/fatigue. Negative for chills, decreased appetite, weight gain and weight loss.   HENT: Negative for congestion, ear discharge, hoarse voice and tinnitus.    Eyes: Negative for blurred vision, discharge, visual disturbance and visual halos.   Cardiovascular: Negative for chest pain, claudication, dyspnea on exertion, irregular heartbeat, leg swelling, orthopnea and paroxysmal nocturnal dyspnea.   Respiratory: Negative for cough, shortness of breath, sputum production and wheezing.    Endocrine: Negative for cold intolerance, heat intolerance and polyuria.   Hematologic/Lymphatic: Negative for adenopathy. Does not bruise/bleed easily.   Skin: Negative for dry skin, itching and suspicious lesions.   Musculoskeletal: Negative for arthritis, back pain, falls, joint pain, muscle weakness and myalgias.   Gastrointestinal: Negative for abdominal pain, constipation, diarrhea, dysphagia and hematemesis.   Genitourinary: Negative for bladder incontinence, dysuria and frequency.   Neurological: Negative for aphonia, disturbances in coordination, dizziness and weakness.   Psychiatric/Behavioral: Negative for altered mental status, depression, memory loss and substance abuse. The patient does not have insomnia and is not nervous/anxious.           Medications:     Allergies:   Allergies   Allergen Reactions   • Valium [Diazepam] Unknown - Low Severity   • Xanax [Alprazolam] Unknown - Low Severity   • Morphine Anxiety       Current Meds:   Current Facility-Administered Medications:   •  acetaminophen (TYLENOL) tablet 650 mg, 650 mg, Oral, Q6H PRN **OR** acetaminophen (TYLENOL) suppository 650 mg, 650 mg, Rectal, Q4H PRN, Gerard Payne MD  •  carvedilol (COREG) tablet 6.25 mg, 6.25 mg, Per G Tube, BID With Meals, Gerard Payne MD  •  dextrose (D50W) 25 g/ 50mL Intravenous Solution 25 g, 25 g, Intravenous, Q15 Min PRN, Gerard Payne MD  •  dextrose (GLUTOSE) oral gel 15 g, 15 g, Oral, Q15 Min PRN, Gerard Payne MD  •  docusate sodium (COLACE) capsule 100 mg, 100 mg, Per G Tube, BID, Gerard Payne MD  •  glucagon (human recombinant) (GLUCAGEN DIAGNOSTIC) injection 1 mg, 1 mg, Subcutaneous, Q15 Min PRN, Gerard Payne MD  •  guaiFENesin (ROBITUSSIN) 100 MG/5ML oral solution 200 mg, 200 mg, Per G Tube, 4x Daily, Gerard Payne MD  •  heparin (porcine) 5000 UNIT/ML injection 5,000 Units, 5,000 Units, Subcutaneous, Q8H, Gerard Payne MD  •  hydrALAZINE (APRESOLINE) tablet 25 mg, 25 mg, Per G Tube, Q8H, Lani Weldon APRN  •  insulin detemir (LEVEMIR) injection 5 Units, 5 Units, Subcutaneous, Nightly, Lani Weldon APRN  •  insulin lispro (humaLOG) injection 0-24 Units, 0-24 Units, Subcutaneous, 4x Daily With Meals & Nightly, Gerard Payne MD  •  ipratropium-albuterol (DUO-NEB) nebulizer solution 3 mL, 3 mL, Nebulization, Q4H PRN, Gerard Payne MD  •  labetalol (NORMODYNE,TRANDATE) injection 20 mg, 20 mg, Intravenous, Q4H PRN, Gerard Payne MD  •  lactulose solution 20 g, 20 g, Per G Tube, Once, Lani Weldon APRN  •  lactulose solution 20 g, 20 g, Per G Tube, Daily PRN, Lani Weldon APRN  •  lansoprazole (FIRST) oral suspension 30  "mg, 30 mg, Per G Tube, Daily, Lani Weldon APRN  •  ondansetron (ZOFRAN) injection 4 mg, 4 mg, Intravenous, Q6H PRN, Gerard Payne MD  •  oxybutynin (DITROPAN) tablet 5 mg, 5 mg, Per G Tube, TID, Gerard Payne MD  •  polyethylene glycol (MIRALAX) packet 17 g, 17 g, Per G Tube, Daily, Gerard Payne MD  •  predniSONE (DELTASONE) tablet 20 mg, 20 mg, Oral, Daily With Breakfast, Lani Weldon APRN  •  racemic epinephrine (RACEPINEPHRINE) nebulizer solution 0.5 mL, 0.5 mL, Nebulization, Q4H PRN, Marylu Marc APRN  •  risperiDONE (risperDAL) tablet 0.5 mg, 0.5 mg, Per G Tube, Nightly, Gerard Payne MD  •  saccharomyces boulardii (FLORASTOR) capsule 250 mg, 250 mg, Oral, BID, Gerard Payne MD  •  sodium chloride nasal spray 1 spray, 1 spray, Each Nare, PRN, Gerard Payne MD  •  Laurie's amazing butt cream, , Topical, BID, Disha Longo APRN  •  Laurie's amazing butt cream, , Topical, PRN, Disha Longo APRN    Data:     Code Status:    There are no questions and answers to display.       History reviewed. No pertinent family history.    Social History     Socioeconomic History   • Marital status: Unknown     Spouse name: Not on file   • Number of children: Not on file   • Years of education: Not on file   • Highest education level: Not on file       Vitals:  Ht 157.5 cm (62.01\")   Wt 108 kg (238 lb 1.6 oz)   BMI 43.54 kg/m²   T 97.9 P 95 R 17 /73 Sp02 95% (room air)       I/O (24Hr):  No intake or output data in the 24 hours ending 09/20/20 0920    Labs and imaging:      Recent Results (from the past 12 hour(s))   Magnesium    Collection Time: 09/20/20  3:55 AM    Specimen: Blood   Result Value Ref Range    Magnesium 1.8 1.6 - 2.6 mg/dL   Phosphorus    Collection Time: 09/20/20  3:55 AM    Specimen: Blood   Result Value Ref Range    Phosphorus 3.1 2.5 - 4.5 mg/dL   Lavender Top    Collection Time: 09/20/20  3:56 AM " "  Result Value Ref Range    Extra Tube hold for add-on    POC Glucose Once    Collection Time: 09/20/20  7:25 AM    Specimen: Blood   Result Value Ref Range    Glucose 95 70 - 130 mg/dL         Physical Examination:        Physical Exam   Constitutional: She appears well-developed and well-nourished.   HENT:   Head: Normocephalic and atraumatic.   Nose: Nose normal.   Eyes: Pupils are equal, round, and reactive to light. Conjunctivae and EOM are normal.   Neck: Normal range of motion. Neck supple.   Cardiovascular: Normal rate, regular rhythm, normal heart sounds and intact distal pulses.   Pulmonary/Chest: Effort normal and breath sounds normal.   Abdominal: Soft. Bowel sounds are normal. She exhibits distension.   Obese   Musculoskeletal: Edema (LUE) present.      Comments: Generalized weakness  Left hemiplegia   Neurological: She is alert.   Interacting appropriately with staff  Oriented to person and place (\"2021\")   Skin: Skin is warm and dry.   Scattered bruising to extremities   Psychiatric: She has a normal mood and affect. Her behavior is normal.   Nursing note and vitals reviewed.        Assessment:            * No active hospital problems. *    Past Medical History:   Diagnosis Date   • Anxiety    • Asthma    • Brain injury (CMS/HCC)    • Bronchitis    • Dysphagia    • GERD (gastroesophageal reflux disease)    • Hypertension    • Obesity    • Pseudobulbar affect    • Rheumatoid arthritis (CMS/HCC)         Plan:        1. Acute hypoxic/hypercapneic respiratory failure  2. DM2 with hyperglycemia  3. Dysphagia  4. HTN  5. Late effects of previous CVA  6. GERD  7. Anxiety  8. RA  9. Morbid obesity  10. S/p aspiration event  11. S/p respiratory arrest  12. Pseudobulbar affect  13. Hypernatremia  14. MRSA sputum    Continue current treatment. Monitor counts. Increase activity as tolerated. Continue modified po diet and encourage increased po intake.  Labs Monday. Maintain patient safety.  Steroid weaning.  " Watch off antibiotics. Monitor closely for respiratory distress. Monitor platelet count closely.       Electronically signed by BEAU Pillai on 9/20/2020 at 09:20 CDT     I have discussed the care of Makenzie Norman, including pertinent history and exam findings, with the nurse practitioner.    I have seen and examined the patient and the key elements of all parts of the encounter have been performed by me.  I agree with the assessment, plan and orders as documented by BEAU Polanco, after I modified the exam findings and the plan of treatments and the final version is my approved version of the assessment.        Electronically signed by Gerard Payne MD on 9/20/2020 at 21:11 CDT

## 2020-09-21 VITALS — WEIGHT: 198.9 LBS | BODY MASS INDEX: 36.6 KG/M2 | HEIGHT: 62 IN

## 2020-09-21 LAB
CA-I BLD-MCNC: 3.95 MG/DL (ref 4.6–5.4)
CRP SERPL-MCNC: 4.58 MG/DL (ref 0–0.5)
GLUCOSE BLDC GLUCOMTR-MCNC: 114 MG/DL (ref 70–130)
GLUCOSE BLDC GLUCOMTR-MCNC: 116 MG/DL (ref 70–130)
GLUCOSE BLDC GLUCOMTR-MCNC: 137 MG/DL (ref 70–130)
GLUCOSE BLDC GLUCOMTR-MCNC: 91 MG/DL (ref 70–130)
Lab: ABNORMAL
PREALB SERPL-MCNC: 19.7 MG/DL (ref 20–40)
TRIGL SERPL-MCNC: 241 MG/DL (ref 0–150)

## 2020-09-21 PROCEDURE — 84134 ASSAY OF PREALBUMIN: CPT | Performed by: INTERNAL MEDICINE

## 2020-09-21 PROCEDURE — 86140 C-REACTIVE PROTEIN: CPT | Performed by: INTERNAL MEDICINE

## 2020-09-21 PROCEDURE — 97535 SELF CARE MNGMENT TRAINING: CPT

## 2020-09-21 PROCEDURE — 63710000001 INSULIN DETEMIR PER 5 UNITS: Performed by: NURSE PRACTITIONER

## 2020-09-21 PROCEDURE — 82330 ASSAY OF CALCIUM: CPT

## 2020-09-21 PROCEDURE — 82962 GLUCOSE BLOOD TEST: CPT

## 2020-09-21 PROCEDURE — 97110 THERAPEUTIC EXERCISES: CPT

## 2020-09-21 PROCEDURE — 25010000002 HEPARIN (PORCINE) PER 1000 UNITS: Performed by: INTERNAL MEDICINE

## 2020-09-21 PROCEDURE — 63710000001 PREDNISONE PER 1 MG: Performed by: NURSE PRACTITIONER

## 2020-09-21 PROCEDURE — 84478 ASSAY OF TRIGLYCERIDES: CPT | Performed by: INTERNAL MEDICINE

## 2020-09-21 RX ORDER — CARVEDILOL 3.12 MG/1
6.25 TABLET ORAL 2 TIMES DAILY WITH MEALS
Status: DISCONTINUED | OUTPATIENT
Start: 2020-09-21 | End: 2020-09-22 | Stop reason: HOSPADM

## 2020-09-21 RX ORDER — OXYBUTYNIN CHLORIDE 5 MG/1
5 TABLET ORAL 3 TIMES DAILY
Status: DISCONTINUED | OUTPATIENT
Start: 2020-09-21 | End: 2020-09-22 | Stop reason: HOSPADM

## 2020-09-21 RX ORDER — GUAIFENESIN 600 MG/1
600 TABLET, EXTENDED RELEASE ORAL 2 TIMES DAILY
Status: DISCONTINUED | OUTPATIENT
Start: 2020-09-21 | End: 2020-09-22 | Stop reason: HOSPADM

## 2020-09-21 RX ORDER — HYDRALAZINE HYDROCHLORIDE 25 MG/1
25 TABLET, FILM COATED ORAL EVERY 8 HOURS SCHEDULED
Status: DISCONTINUED | OUTPATIENT
Start: 2020-09-21 | End: 2020-09-22 | Stop reason: HOSPADM

## 2020-09-21 RX ORDER — PREDNISONE 10 MG/1
10 TABLET ORAL
Status: DISCONTINUED | OUTPATIENT
Start: 2020-09-22 | End: 2020-09-22 | Stop reason: HOSPADM

## 2020-09-21 RX ORDER — RISPERIDONE 0.5 MG/1
0.5 TABLET ORAL NIGHTLY
Status: DISCONTINUED | OUTPATIENT
Start: 2020-09-21 | End: 2020-09-22 | Stop reason: HOSPADM

## 2020-09-21 RX ORDER — DOCUSATE SODIUM 100 MG/1
100 CAPSULE, LIQUID FILLED ORAL 2 TIMES DAILY
Status: DISCONTINUED | OUTPATIENT
Start: 2020-09-21 | End: 2020-09-22 | Stop reason: HOSPADM

## 2020-09-21 RX ORDER — POLYETHYLENE GLYCOL 3350 17 G/17G
17 POWDER, FOR SOLUTION ORAL DAILY
Status: DISCONTINUED | OUTPATIENT
Start: 2020-09-22 | End: 2020-09-22 | Stop reason: HOSPADM

## 2020-09-21 RX ORDER — PANTOPRAZOLE SODIUM 40 MG/1
40 TABLET, DELAYED RELEASE ORAL
Status: DISCONTINUED | OUTPATIENT
Start: 2020-09-22 | End: 2020-09-22 | Stop reason: HOSPADM

## 2020-09-21 NOTE — PROGRESS NOTES
HELENA Corbin APRN        Internal Medicine Progress Note    9/21/2020   13:17 CDT    Name:  Makenzie Norman  MRN:    [id number]     Acct:     [id number]   Room:  [address] [place] [address] Day: 0     Admit Date: 9/1/2020  2:09 PM  PCP: Provider, No Known    Subjective:     C/C: need for continued oxygen weaning and nutrition support.     Interval History: Status:improved. Resting in bed. No family at bedside. Alert and interacting with staff. Confused, but at baseline.  Tolerating modified po diet without difficulty.  Afebrile. Discharge planning underway.     Review of Systems   Constitution: Positive for malaise/fatigue. Negative for chills, decreased appetite, weight gain and weight loss.   HENT: Negative for congestion, ear discharge, hoarse voice and tinnitus.    Eyes: Negative for blurred vision, discharge, visual disturbance and visual halos.   Cardiovascular: Negative for chest pain, claudication, dyspnea on exertion, irregular heartbeat, leg swelling, orthopnea and paroxysmal nocturnal dyspnea.   Respiratory: Negative for cough, shortness of breath, sputum production and wheezing.    Endocrine: Negative for cold intolerance, heat intolerance and polyuria.   Hematologic/Lymphatic: Negative for adenopathy. Does not bruise/bleed easily.   Skin: Negative for dry skin, itching and suspicious lesions.   Musculoskeletal: Negative for arthritis, back pain, falls, joint pain, muscle weakness and myalgias.   Gastrointestinal: Negative for abdominal pain, constipation, diarrhea, dysphagia and hematemesis.   Genitourinary: Negative for bladder incontinence, dysuria and frequency.   Neurological: Negative for aphonia, disturbances in coordination, dizziness and weakness.   Psychiatric/Behavioral: Negative for altered mental status, depression, memory loss and substance abuse. The patient does not have insomnia and is not nervous/anxious.          Medications:     Allergies:   Allergies    Allergen Reactions   • Valium [Diazepam] Unknown - Low Severity   • Xanax [Alprazolam] Unknown - Low Severity   • Morphine Anxiety       Current Meds:   Current Facility-Administered Medications:   •  acetaminophen (TYLENOL) tablet 650 mg, 650 mg, Oral, Q6H PRN **OR** acetaminophen (TYLENOL) suppository 650 mg, 650 mg, Rectal, Q4H PRN, Gerard Payne MD  •  carvedilol (COREG) tablet 6.25 mg, 6.25 mg, Per G Tube, BID With Meals, Gerard Payne MD  •  dextrose (D50W) 25 g/ 50mL Intravenous Solution 25 g, 25 g, Intravenous, Q15 Min PRN, Gerard Payne MD  •  dextrose (GLUTOSE) oral gel 15 g, 15 g, Oral, Q15 Min PRN, Gerard Payne MD  •  docusate sodium (COLACE) capsule 100 mg, 100 mg, Per G Tube, BID, Gerard Payne MD  •  glucagon (human recombinant) (GLUCAGEN DIAGNOSTIC) injection 1 mg, 1 mg, Subcutaneous, Q15 Min PRN, Gerard Payne MD  •  guaiFENesin (ROBITUSSIN) 100 MG/5ML oral solution 200 mg, 200 mg, Per G Tube, 4x Daily, Gerard Payne MD  •  heparin (porcine) 5000 UNIT/ML injection 5,000 Units, 5,000 Units, Subcutaneous, Q8H, Gerard Payne MD  •  hydrALAZINE (APRESOLINE) tablet 25 mg, 25 mg, Per G Tube, Q8H, Lani Weldon, BEAU  •  insulin detemir (LEVEMIR) injection 5 Units, 5 Units, Subcutaneous, Nightly, Lani Weldon, BEAU  •  insulin lispro (humaLOG) injection 0-24 Units, 0-24 Units, Subcutaneous, 4x Daily With Meals & Nightly, Gerard Payne MD  •  ipratropium-albuterol (DUO-NEB) nebulizer solution 3 mL, 3 mL, Nebulization, Q4H PRN, Gerard Payne MD  •  labetalol (NORMODYNE,TRANDATE) injection 20 mg, 20 mg, Intravenous, Q4H PRN, Gerard Payne MD  •  lactulose solution 20 g, 20 g, Per G Tube, Once, Lani Weldon APRN  •  lactulose solution 20 g, 20 g, Per G Tube, Daily PRN, Lani Weldon APRN  •  lansoprazole (FIRST) oral suspension 30 mg, 30 mg, Per G Tube, Daily, Lani Weldon  "Ramona, BEAU  •  ondansetron (ZOFRAN) injection 4 mg, 4 mg, Intravenous, Q6H PRN, Gerard Payne MD  •  oxybutynin (DITROPAN) tablet 5 mg, 5 mg, Per G Tube, TID, Gerard Payne MD  •  polyethylene glycol (MIRALAX) packet 17 g, 17 g, Per G Tube, Daily, Gerard Payne MD  •  predniSONE (DELTASONE) tablet 20 mg, 20 mg, Oral, Daily With Breakfast, Lani Weldon APRN  •  racemic epinephrine (RACEPINEPHRINE) nebulizer solution 0.5 mL, 0.5 mL, Nebulization, Q4H PRN, Marylu Marc APRN  •  risperiDONE (risperDAL) tablet 0.5 mg, 0.5 mg, Per G Tube, Nightly, Gerard Payne MD  •  saccharomyces boulardii (FLORASTOR) capsule 250 mg, 250 mg, Oral, BID, Gerard Payne MD  •  sodium chloride nasal spray 1 spray, 1 spray, Each Nare, PRN, Gerard Payne MD  •  Laurie's amazing butt cream, , Topical, BID, Disha Longo APRN  •  Laurie's amazing butt cream, , Topical, PRN, Disha Longo APRN    Data:     Code Status:    There are no questions and answers to display.       History reviewed. No pertinent family history.    Social History     Socioeconomic History   • Marital status: Unknown     Spouse name: Not on file   • Number of children: Not on file   • Years of education: Not on file   • Highest education level: Not on file       Vitals:  Ht 157.5 cm (62.01\")   Wt 108 kg (238 lb 1.6 oz)   BMI 43.54 kg/m²   T 98.6 P 89 R 18 /93 Sp02 96% (room air)       I/O (24Hr):  No intake or output data in the 24 hours ending 09/21/20 1317    Labs and imaging:      Recent Results (from the past 12 hour(s))   C-reactive Protein    Collection Time: 09/21/20  5:29 AM    Specimen: Blood   Result Value Ref Range    C-Reactive Protein 4.58 (H) 0.00 - 0.50 mg/dL   Prealbumin    Collection Time: 09/21/20  5:29 AM    Specimen: Blood   Result Value Ref Range    Prealbumin 19.7 (L) 20.0 - 40.0 mg/dL   Triglycerides    Collection Time: 09/21/20  5:29 AM    Specimen: Blood " "  Result Value Ref Range    Triglycerides 241 (H) 0 - 150 mg/dL   Calcium, Ionized    Collection Time: 09/21/20  5:52 AM    Specimen: Blood   Result Value Ref Range    Ionized Calcium 3.95 (L) 4.60 - 5.40 mg/dL    Collected by OTHER    POC Glucose Once    Collection Time: 09/21/20  7:30 AM    Specimen: Blood   Result Value Ref Range    Glucose 91 70 - 130 mg/dL   POC Glucose Once    Collection Time: 09/21/20 11:25 AM    Specimen: Blood   Result Value Ref Range    Glucose 137 (H) 70 - 130 mg/dL         Physical Examination:        Physical Exam   Constitutional: She appears well-developed and well-nourished.   HENT:   Head: Normocephalic and atraumatic.   Nose: Nose normal.   Eyes: Pupils are equal, round, and reactive to light. Conjunctivae and EOM are normal.   Neck: Normal range of motion. Neck supple.   Cardiovascular: Normal rate, regular rhythm, normal heart sounds and intact distal pulses.   Pulmonary/Chest: Effort normal and breath sounds normal.   Abdominal: Soft. Bowel sounds are normal. She exhibits distension.   Obese   Musculoskeletal: Edema (LUE) present.      Comments: Generalized weakness  Left hemiplegia   Neurological: She is alert.   Interacting appropriately with staff  Oriented to person and place (\"2021\")   Skin: Skin is warm and dry.   Scattered bruising to extremities   Psychiatric: She has a normal mood and affect. Her behavior is normal.   Nursing note and vitals reviewed.        Assessment:            * No active hospital problems. *    Past Medical History:   Diagnosis Date   • Anxiety    • Asthma    • Brain injury (CMS/HCC)    • Bronchitis    • Dysphagia    • GERD (gastroesophageal reflux disease)    • Hypertension    • Obesity    • Pseudobulbar affect    • Rheumatoid arthritis (CMS/HCC)         Plan:        1. Acute hypoxic/hypercapneic respiratory failure  2. DM2 with hyperglycemia  3. Dysphagia  4. HTN  5. Late effects of previous CVA  6. GERD  7. Anxiety  8. RA  9. Morbid " obesity  10. S/p aspiration event  11. S/p respiratory arrest  12. Pseudobulbar affect  13. Hypernatremia  14. MRSA sputum    Continue current treatment. Monitor counts. Increase activity as tolerated. Continue modified po diet and encourage increased po intake.  Labs Thursday. Maintain patient safety.  Steroid weaning.  Watch off antibiotics. Monitor closely for respiratory distress. Monitor platelet count closely. Change meds to po.       Electronically signed by BEAU Sheppard on 9/21/2020 at 13:17 CDT

## 2020-09-21 NOTE — PROGRESS NOTES
Adult Nutrition  Assessment/PES    Patient Name:  Makenzie Norman  YOB: 1960  MRN: [id number]  Admit Date:  9/1/2020    Assessment Date:  9/21/2020    Comments:  Pt's TPN/Lipids were discontinued late last week. Pt was started on Pureed; Pudding thick liquids on 9/17. Her intake is poor at this time, 18% avg of the last 6 meals. She is able to feed herself. Per RN, pt eats well when she has foods that she likes to eat. Observed RN providing pt a chocolate pudding. Ordered Boost pudding daily with lunch and dinner. Encouraged PO intake, will continue to follow.     Reason for Assessment     Row Name 09/21/20 1352          Reason for Assessment    Reason For Assessment  follow-up protocol         Nutrition/Diet History     Row Name 09/21/20 135          Nutrition/Diet History    Typical Food/Fluid Intake  Pt is no longer on TPN/Lipids. She was started on a PO diet on 9/17. So far pt has been tolerating PO diet. She is not eating very well at this time. Per RN, pt eats well with foods that she likes. RN was providing pt a pudding at time of RD visit.     Factors Affecting Nutritional Intake  difficulty/impaired swallowing         Anthropometrics     Row Name 09/21/20 1356          Body Mass Index (BMI)    BMI Assessment  BMI 40 or greater: obesity grade III         Labs/Tests/Procedures/Meds     Row Name 09/21/20 1359          Labs/Procedures/Meds    Lab Results Reviewed  reviewed        Medications    Pertinent Medications Reviewed  reviewed, pertinent     Pertinent Medications Comments  colace; heparin; insulin; miralax; florastor; steroid         Physical Findings     Row Name 09/21/20 1401          Physical Findings    Overall Physical Appearance  obese;on oxygen therapy Weakness     Skin  other (see comments);edema Wounds noted. bradedn 11           Nutrition Prescription Ordered     Row Name 09/21/20 1401          Nutrition Prescription PO    Current PO Diet  Pureed     Fluid Consistency  Pudding  thick         Evaluation of Received Nutrient/Fluid Intake     Row Name 09/21/20 1402          Nutrient/Fluid Evaluation    Number of Days Evaluated  2 days     Additional Documentation  Fluid Intake Evaluation (Group)        Fluid Intake Evaluation    Oral Fluid (mL)  815        PO Evaluation    Number of Days PO Intake Evaluated  2 days     Number of Meals  6     % PO Intake  18               Problem/Interventions:  Problem 1     Row Name 09/21/20 1402          Nutrition Diagnoses Problem 1    Problem 1  Inadequate Nutrient Intake     Etiology (related to)  Medical Diagnosis     Neurological  Other (comment);TBI;CVA h/o TBI; CVA     Pulmonary/Critical Care  Acute respiratory failure;Hypercapnea;Hypoxemia;Pneumonia     Signs/Symptoms (evidenced by)  PO Intake     Percent (%) intake recorded  18 %     Over number of meals  6               Intervention Goal     Row Name 09/21/20 1403          Intervention Goal    General  Disease management/therapy;Reduce/improve symptoms;Meet nutritional needs for age/condition     PO  Increase intake;Meet estimated needs     Weight  No significant weight loss         Nutrition Intervention     Row Name 09/21/20 1403          Nutrition Intervention    RD/Tech Action  Follow Tx progress;Care plan reviewd;Encourage intake;Recommend/ordered     Recommended/Ordered  Supplement         Nutrition Prescription     Row Name 09/21/20 1403          Nutrition Prescription PO    PO Prescription  Begin/change supplement     Supplement  Boost Pudding     Supplement Frequency  2 times a day     New PO Prescription Ordered?  Yes         Education/Evaluation     Row Name 09/21/20 1404          Education    Education  No discharge needs identified at this time        Monitor/Evaluation    Monitor  Per protocol           Electronically signed by:  Chitra Davis  09/21/20 14:04 CDT

## 2020-09-22 LAB
GLUCOSE BLDC GLUCOMTR-MCNC: 153 MG/DL (ref 70–130)
MAGNESIUM SERPL-MCNC: 1.8 MG/DL (ref 1.6–2.6)
PHOSPHATE SERPL-MCNC: 2.9 MG/DL (ref 2.5–4.5)

## 2020-09-22 PROCEDURE — 25010000002 HEPARIN (PORCINE) PER 1000 UNITS: Performed by: INTERNAL MEDICINE

## 2020-09-22 PROCEDURE — 82962 GLUCOSE BLOOD TEST: CPT

## 2020-09-22 PROCEDURE — 84100 ASSAY OF PHOSPHORUS: CPT | Performed by: INTERNAL MEDICINE

## 2020-09-22 PROCEDURE — 63710000001 PREDNISONE PER 5 MG: Performed by: NURSE PRACTITIONER

## 2020-09-22 PROCEDURE — 83735 ASSAY OF MAGNESIUM: CPT | Performed by: INTERNAL MEDICINE

## 2020-09-22 PROCEDURE — 63710000001 INSULIN LISPRO (HUMAN) PER 5 UNITS: Performed by: INTERNAL MEDICINE

## 2020-09-22 NOTE — DISCHARGE SUMMARY
Elmer Payne M.D.  [facility]      Internal Medicine Discharge Summary    Patient ID: Makenzie Norman  MRN: [id number]     Acct:  [id number]       Patient's PCP: Provider, No Known    Admit Date: 9/1/2020     Discharge Date:   9/22/20    Admitting Physician: Gerard Payne MD    Discharge Physician: BEAU Sheppard     Active Discharge Diagnoses:  1. Acute hypoxic/hypercapneic respiratory failure  2. DM2 with hyperglycemia  3. Dysphagia  4. HTN  5. Late effects of previous CVA  6. GERD  7. Anxiety  8. RA  9. Morbid obesity  10. S/p aspiration event  11. S/p respiratory arrest  12. Pseudobulbar affect  13. Hypernatremia  14. MRSA sputum  Hospital Problems    * No active hospital problems. *     Past Medical History:   Diagnosis Date   • Anxiety    • Asthma    • Brain injury (CMS/HCC)    • Bronchitis    • Dysphagia    • GERD (gastroesophageal reflux disease)    • Hypertension    • Obesity    • Pseudobulbar affect    • Rheumatoid arthritis (CMS/HCC)        The patient was seen and examined on the day of discharge and this discharge summary is in conjunction with any daily progress note from day of discharge.    Code Status:    There are no questions and answers to display.       Hospital Course: Makenzie Norman is a  59 y.o.  female who presents with need for continued oxygen weaning and nutrition support following recent acute care stay. The patient had been in her usual state of health at the [facility] where she is a long term resident following brain injury when she suffered an aspiration event with loss of consciousness and respiratory arrest. Despite her DNR status, resuscitation efforts were initiated and ROSC achieved after approximately 15 minutes. She transferred to an outlying facility for evaluation and treatment. She was severely acidotic with hypercarbia ([id number]) and was placed on bipap. She was treated with broad spectrum IV antibiotics. She had some  improvement in her neurologic status and CT head negative. TPN was initiated for nutrition support as she has not been felt safe for po intake to this point. She has alternated between NRB and bipap for oxygen support. She transferred to our facility for continued oxygen weaning and nutrition support prior to her planned return to SNF upon discharge. She tolerated bipap on transfer.  Due to elevated blood pressures, she required prn beta blockers. She was lightly sedated with precedex at that time.   We spoke with the family who indicated that they did not wish to pursue feeding tube placement and would likely pursue comfort care if it came to that point. Sputum culture positive MRSA and patient started on IV vancomycin. The p[atient weaned to oxygen per nasal cannula. She continued to require TPN for nutrition and DHT was placed for medication administration. Antibiotics were completed/discontinued and patient has done well since that time. She was placed on cool mist aerosol. She developed significant respiratory distress requiring racemic epinephrine nebulizer and IV lasix. The patient was evaluated by SLP and was started on modified po diet. DHT discontinued and TPN discontinued. Since that time, patient has returned to her neurologic baseline. She is oriented to person and place and disoriented to time. She has been weaned to room air and is maintaining adequate 02 sats. She's now felt stable for discharge to SNF for continued rehabilitation efforts and ongoing care.     Consults:  None    Disposition: SNF    Discharged Condition: Stable    Physical Exam   Constitutional: She appears well-developed and well-nourished.   HENT:   Head: Normocephalic and atraumatic.   Nose: Nose normal.   Eyes: Pupils are equal, round, and reactive to light. Conjunctivae and EOM are normal.   Neck: Normal range of motion. Neck supple.   Cardiovascular: Normal rate, regular rhythm, normal heart sounds and intact distal pulses.  "  Pulmonary/Chest: Effort normal and breath sounds normal.   Abdominal: Soft. Bowel sounds are normal. She exhibits distension.   Obese   Musculoskeletal: Edema (LUE) present.      Comments: Generalized weakness  Left hemiplegia   Neurological: She is alert.   Interacting appropriately with staff  Oriented to person and place (\"2021\")   Skin: Skin is warm and dry.   Scattered bruising to extremities   Psychiatric: She has a normal mood and affect. Her behavior is normal.   Nursing note and vitals reviewed.    Follow Up: Facility physician of record    Diet: Diet Pureed; Pudding Thick    Discharge Medications:   See computer generated medication reconciliation form    Time Spent on discharge is  32 minutes in patient examination, evaluation, patient/family counseling as well as medication reconciliation, prescriptions for required medications, discharge plan and follow up.     Electronically signed by BEAU Sheppard on 9/22/2020 at 09:51 CDT     I have discussed the care of Makenzie Norman, including pertinent history and exam findings, with the nurse practitioner.    I have seen and examined the patient and the key elements of all parts of the encounter have been performed by me.  I agree with the assessment, plan and orders as documented by [facility] Corona, after I modified the exam findings and the plan of treatments and the final version is my approved version of the assessment.        Electronically signed by Gerard Payne MD on 9/22/2020 at 10:07 CDT    "

## 2020-10-04 LAB — FUNGUS WND CULT: ABNORMAL
